# Patient Record
Sex: FEMALE | Race: WHITE
[De-identification: names, ages, dates, MRNs, and addresses within clinical notes are randomized per-mention and may not be internally consistent; named-entity substitution may affect disease eponyms.]

---

## 2018-01-18 ENCOUNTER — HOSPITAL ENCOUNTER (OUTPATIENT)
Dept: HOSPITAL 31 - C.ER | Age: 63
Setting detail: OBSERVATION
LOS: 2 days | Discharge: HOME | End: 2018-01-20
Payer: COMMERCIAL

## 2018-01-18 VITALS — BODY MASS INDEX: 44.1 KG/M2

## 2018-01-18 DIAGNOSIS — Z98.84: ICD-10-CM

## 2018-01-18 DIAGNOSIS — K29.00: Primary | ICD-10-CM

## 2018-01-18 DIAGNOSIS — Z87.11: ICD-10-CM

## 2018-01-18 DIAGNOSIS — Z88.6: ICD-10-CM

## 2018-01-18 DIAGNOSIS — E11.9: ICD-10-CM

## 2018-01-18 DIAGNOSIS — I10: ICD-10-CM

## 2018-01-18 DIAGNOSIS — M19.90: ICD-10-CM

## 2018-01-18 DIAGNOSIS — E78.00: ICD-10-CM

## 2018-01-18 DIAGNOSIS — D64.9: ICD-10-CM

## 2018-01-18 DIAGNOSIS — M81.0: ICD-10-CM

## 2018-01-18 DIAGNOSIS — Z79.899: ICD-10-CM

## 2018-01-18 DIAGNOSIS — K85.90: ICD-10-CM

## 2018-01-18 LAB
ALBUMIN SERPL-MCNC: 4 G/DL (ref 3.5–5)
ALBUMIN/GLOB SERPL: 1.3 {RATIO} (ref 1–2.1)
ALT SERPL-CCNC: 29 U/L (ref 9–52)
AST SERPL-CCNC: 24 U/L (ref 14–36)
BASE EXCESS BLDV CALC-SCNC: 1.2 MMOL/L (ref 0–2)
BASOPHILS # BLD AUTO: 0.1 K/UL (ref 0–0.2)
BASOPHILS NFR BLD: 1.1 % (ref 0–2)
BUN SERPL-MCNC: 27 MG/DL (ref 7–17)
CALCIUM SERPL-MCNC: 8.8 MG/DL (ref 8.6–10.4)
EOSINOPHIL # BLD AUTO: 0.6 K/UL (ref 0–0.7)
EOSINOPHIL NFR BLD: 7.7 % (ref 0–4)
ERYTHROCYTE [DISTWIDTH] IN BLOOD BY AUTOMATED COUNT: 15.7 % (ref 11.5–14.5)
GFR NON-AFRICAN AMERICAN: 50
HGB BLD-MCNC: 12.2 G/DL (ref 11–16)
LIPASE: 354 U/L (ref 23–300)
LYMPHOCYTES # BLD AUTO: 1.9 K/UL (ref 1–4.3)
LYMPHOCYTES NFR BLD AUTO: 22.7 % (ref 20–40)
MCH RBC QN AUTO: 30.3 PG (ref 27–31)
MCHC RBC AUTO-ENTMCNC: 33.6 G/DL (ref 33–37)
MCV RBC AUTO: 90.2 FL (ref 81–99)
MONOCYTES # BLD: 0.7 K/UL (ref 0–0.8)
MONOCYTES NFR BLD: 8.7 % (ref 0–10)
NEUTROPHILS # BLD: 5 K/UL (ref 1.8–7)
NEUTROPHILS NFR BLD AUTO: 59.8 % (ref 50–75)
NRBC BLD AUTO-RTO: 0.2 % (ref 0–2)
PCO2 BLDV: 41 MMHG (ref 40–60)
PH BLDV: 7.41 [PH] (ref 7.32–7.43)
PLATELET # BLD: 305 K/UL (ref 130–400)
PMV BLD AUTO: 7.7 FL (ref 7.2–11.7)
RBC # BLD AUTO: 4.02 MIL/UL (ref 3.8–5.2)
VENOUS BLOOD GAS PO2: 28 MM/HG (ref 30–55)
WBC # BLD AUTO: 8.3 K/UL (ref 4.8–10.8)

## 2018-01-18 PROCEDURE — 82803 BLOOD GASES ANY COMBINATION: CPT

## 2018-01-18 PROCEDURE — 43239 EGD BIOPSY SINGLE/MULTIPLE: CPT

## 2018-01-18 PROCEDURE — 96374 THER/PROPH/DIAG INJ IV PUSH: CPT

## 2018-01-18 PROCEDURE — 86301 IMMUNOASSAY TUMOR CA 19-9: CPT

## 2018-01-18 PROCEDURE — 86304 IMMUNOASSAY TUMOR CA 125: CPT

## 2018-01-18 PROCEDURE — 88342 IMHCHEM/IMCYTCHM 1ST ANTB: CPT

## 2018-01-18 PROCEDURE — 82948 REAGENT STRIP/BLOOD GLUCOSE: CPT

## 2018-01-18 PROCEDURE — 80061 LIPID PANEL: CPT

## 2018-01-18 PROCEDURE — 74177 CT ABD & PELVIS W/CONTRAST: CPT

## 2018-01-18 PROCEDURE — 96361 HYDRATE IV INFUSION ADD-ON: CPT

## 2018-01-18 PROCEDURE — 82378 CARCINOEMBRYONIC ANTIGEN: CPT

## 2018-01-18 PROCEDURE — 96375 TX/PRO/DX INJ NEW DRUG ADDON: CPT

## 2018-01-18 PROCEDURE — 88305 TISSUE EXAM BY PATHOLOGIST: CPT

## 2018-01-18 PROCEDURE — 85025 COMPLETE CBC W/AUTO DIFF WBC: CPT

## 2018-01-18 PROCEDURE — 83690 ASSAY OF LIPASE: CPT

## 2018-01-18 PROCEDURE — 99285 EMERGENCY DEPT VISIT HI MDM: CPT

## 2018-01-18 PROCEDURE — 80053 COMPREHEN METABOLIC PANEL: CPT

## 2018-01-18 PROCEDURE — 82150 ASSAY OF AMYLASE: CPT

## 2018-01-18 PROCEDURE — 36415 COLL VENOUS BLD VENIPUNCTURE: CPT

## 2018-01-18 PROCEDURE — 96376 TX/PRO/DX INJ SAME DRUG ADON: CPT

## 2018-01-18 NOTE — CT
PROCEDURE:  CT Abdomen and Pelvis with contrast



HISTORY:

abdominal pain



COMPARISON:

Abdomen and pelvis CT with contrast 08/04/2016.



TECHNIQUE:

Contrast dose: Visipaque 320, 100 cc



Radiation dose:



Total exam DLP = 1151.81 mGy-cm.



This CT exam was performed using one or more of the following dose 

reduction techniques: Automated exposure control, adjustment of the 

mA and/or kV according to patient size, and/or use of iterative 

reconstruction technique.



FINDINGS:



LOWER THORAX:

Unremarkable. 



LIVER:

Unremarkable. No gross lesion or ductal dilatation. 



GALLBLADDER AND BILE DUCTS:

Prior cholecystectomy a stable 13 mm dilatation of the proximal to 

mid common bile duct appreciated once again. No radiodense 

choledocholithiasis identified. 



PANCREAS:

Unremarkable. No gross lesion or ductal dilatation.



SPLEEN:

Unremarkable. 



ADRENALS:

Unremarkable. No mass. 



KIDNEYS AND URETERS:

Unremarkable. No hydronephrosis. No solid mass. 



VASCULATURE:

Unremarkable. No aortic aneurysm. 



BOWEL:

Post gastric bypass surgery changes are again seen at the left upper 

quadrant abdomen extending into a small-bowel loop in the left lower 

quadrant once again. No bowel obstruction is appreciated with limited 

fecal loading seen throughout various large-bowel segments. 



APPENDIX:

Normal appendix. 



PERITONEUM:

Unremarkable. No free fluid. No free air. 



LYMPH NODES:

Unremarkable. No enlarged lymph nodes. 



BLADDER:

Unremarkable. 



REPRODUCTIVE:

Unremarkable. 



BONES:

Stable grade 1 spondylolisthesis L5-S1. 



OTHER FINDINGS:

None.



IMPRESSION:

1. No definite acute abdominal or pelvic findings in the interval as 

discussed above. 



2. Prior cholecystectomy again evident.  Stable likely related 

dilatation of the common bile duct again noted as well. No radiodense 

choledocholithiasis. 



3. Prior gastric bypass surgery again evident. 



4. L5-S1 spondylolisthesis again evident, stable.

## 2018-01-18 NOTE — C.PDOC
History Of Present Illness


63 y/o female with history of gastritis and ulcers presents to ED with 

complaints of abdominal pain for 15 days  worsening with associated vomiting 

for 3 days. Patient states she had gastric bypass 8 years ago and reports she 

has taken Omeprazole with no improvement. Patient denies fever, chills, diarrhea

, blood in stool or any other complaints at this time. PMD: Dr. KATARZYNA Harrison 


Time Seen by Provider: 18 14:58


Chief Complaint (Nursing): Abdominal Pain


History Per: Patient


History/Exam Limitations: no limitations


Onset/Duration Of Symptoms: Days


Current Symptoms Are (Timing): Still Present


Location Of Pain/Discomfort: Epigastric





Past Medical History


Reviewed: Historical Data, Nursing Documentation, Vital Signs


Vital Signs: 


 Last Vital Signs











Temp  98.6 F   18 14:52


 


Pulse  82   18 17:46


 


Resp  18   18 17:46


 


BP  101/49 L  18 17:46


 


Pulse Ox  98   18 17:46














- Medical History


PMH: Anemia, Arthritis, Diabetes, Gastritis, HTN, Hypercholesterolemia, 

Osteoporosis


Surgical History: Appendectomy, Cholecystectomy





- [facility] Procedures








CLOSED ENDOSCOPIC BIOPSY OF LARGE INTESTINE (14)


ESOPHAGOGASTRODUODENOSCOPY [EGD] W/CLOSED BIOPSY (14)








Family History: States: No Known Family Hx





- Social History


Hx Tobacco Use: No


Hx Alcohol Use: No


Hx Substance Use: No





- Immunization History


Hx Tetanus Toxoid Vaccination: No


Hx Influenza Vaccination: No


Hx Pneumococcal Vaccination: No





Review Of Systems


Constitutional: Negative for: Fever, Chills


Gastrointestinal: Positive for: Vomiting, Abdominal Pain.  Negative for: 

Diarrhea


Musculoskeletal: Negative for: Back Pain


Skin: Negative for: Rash





Physical Exam





- Physical Exam


Appears: Non-toxic, No Acute Distress


Skin: Normal Color, Warm, Dry, No Rash


Head: Atraumatic, Normacephalic


Oral Mucosa: Moist


Neck: Normal ROM, Supple


Cardiovascular: Rhythm Regular


Respiratory: Normal Breath Sounds, No Rales, No Rhonchi, No Wheezing


Gastrointestinal/Abdominal: Soft, Tenderness (RUQ and Epigastric), No Guarding, 

No Rebound


Back: No CVA Tenderness


Extremity: Normal ROM, Capillary Refill (<2 seconds)


Neurological/Psych: Oriented x3





ED Course And Treatment





- Laboratory Results


Result Diagrams: 


 18 17:42





 18 16:51


O2 Sat by Pulse Oximetry: 98 (RA)


Pulse Ox Interpretation: Normal





Medical Decision Making


Medical Decision Makin disc w Dr Griffiths will admit for intractable abdominal pain





Disposition





- Disposition


Disposition: HOSPITALIZED


Disposition Time: 19:02


Condition: STABLE


Forms:  [facility] (English)





- Clinical Impression


Clinical Impression: 


 Abdominal pain








- Scribe Statement


The provider has reviewed the documentation as recorded by the Scribe


Santo Magallanes





All medical record entries made by the Augustibalbert were at my direction and 

personally dictated by me. I have reviewed the chart and agree that the record 

accurately reflects my personal performance of the history, physical exam, 

medical decision making, and the department course for this patient. I have 

also personally directed, reviewed, and agree with the discharge instructions 

and disposition.

## 2018-01-19 VITALS — RESPIRATION RATE: 20 BRPM

## 2018-01-19 RX ADMIN — PHENOBARBITAL, HYOSCYAMINE SULFATE, ATROPINE SULFATE, SCOPOLAMINE HYDROBROMIDE SCH TAB: 16.2; .1037; .0194; .0065 TABLET ORAL at 14:02

## 2018-01-19 RX ADMIN — PANTOPRAZOLE SODIUM SCH: 40 TABLET, DELAYED RELEASE ORAL at 11:00

## 2018-01-19 RX ADMIN — PHENOBARBITAL, HYOSCYAMINE SULFATE, ATROPINE SULFATE, SCOPOLAMINE HYDROBROMIDE SCH TAB: 16.2; .1037; .0194; .0065 TABLET ORAL at 18:39

## 2018-01-19 RX ADMIN — SUCRALFATE SCH GM: 1 SUSPENSION ORAL at 21:39

## 2018-01-19 RX ADMIN — DEXTROSE AND SODIUM CHLORIDE SCH MLS/HR: 5; 450 INJECTION, SOLUTION INTRAVENOUS at 20:30

## 2018-01-19 RX ADMIN — DEXTROSE AND SODIUM CHLORIDE SCH MLS/HR: 5; 450 INJECTION, SOLUTION INTRAVENOUS at 08:35

## 2018-01-19 RX ADMIN — PANTOPRAZOLE SODIUM SCH MG: 40 TABLET, DELAYED RELEASE ORAL at 12:36

## 2018-01-19 NOTE — CP.PCM.PN
Subjective





- Date & Time of Evaluation


Date of Evaluation: 01/19/18


Time of Evaluation: 09:00





Objective





- Vital Signs/Intake and Output


Vital Signs (last 24 hours): 


 











Temp Pulse Resp BP Pulse Ox


 


 98 F   63   20   104/70   95 


 


 01/19/18 16:00  01/19/18 16:00  01/19/18 16:00  01/19/18 16:00  01/19/18 16:00








Intake and Output: 


 











 01/19/18 01/20/18





 18:59 06:59


 


Intake Total 690 790


 


Balance 690 790














- Medications


Medications: 


 Current Medications





Belladonna/Phenobarbital (Donnatal)  1 tab PO TID [facility]


   Last Admin: 01/19/18 18:39 Dose:  1 tab


Dicyclomine HCl (Bentyl)  10 mg PO DAILY [facility]


   Last Admin: 01/19/18 12:37 Dose:  10 mg


Hydrochlorothiazide (Hydrodiuril)  25 mg PO DAILY [facility]


   Last Admin: 01/19/18 12:36 Dose:  25 mg


Hydromorphone HCl (Dilaudid)  0.25 mg IVP Q4H PRN


   PRN Reason: Pain, Mild (1-3)


   Last Admin: 01/18/18 23:05 Dose:  0.25 mg


Dextrose/Sodium Chloride (Dextrose 5%/0.45% Ns 1000 Ml)  1,000 mls @ 80 mls/hr 

IV .[id number] [facility]


   Last Admin: 01/19/18 20:30 Dose:  80 mls/hr


Lisinopril (Zestril)  10 mg PO DAILY [facility]


   Last Admin: 01/19/18 12:36 Dose:  10 mg


Metoclopramide HCl (Reglan)  5 mg PO TID PRN


   PRN Reason: Nausea/Vomiting


Ondansetron HCl (Zofran Inj)  8 mg IVP Q8H [facility]


   Stop: 01/22/18 08:31


   Last Admin: 01/19/18 16:55 Dose:  Not Given


Pantoprazole Sodium (Protonix Ec Tab)  40 mg PO DAILY [facility]


   Last Admin: 01/19/18 12:36 Dose:  40 mg


Rosuvastatin Calcium (Crestor)  5 mg PO HS [facility]


   Last Admin: 01/19/18 21:40 Dose:  5 mg


Sucralfate (Carafate Oral Susp)  1 gm PO ACBHS [facility]


   Last Admin: 01/19/18 21:39 Dose:  1 gm











- Labs


Labs: 


 





 01/18/18 17:42 





 01/18/18 16:51

## 2018-01-19 NOTE — CON
DATE:  01/18/2018



LOCATION:  350, bed [facility] was called for GI consultation by the admitting MD as well as the ER

physician and the ER staff.  The patient was seen and fully examined on

01/18/2018.  The entire chart is reviewed including but not limited to the

most recent lab and radiology study results, current and the previous

medication list, current and the previous medical events, allergy to

medication list as well as all the available current and the previous

medical record.  Case discussed with the staff at length.



HISTORY OF PRESENT ILLNESS:  This is a 62-year-old female who was admitted

to the hospital through the emergency room with a main complaint of severe

sharp, crampy abdominal pain mainly in the midepigastric area to the

midabdominal line associated with nausea and vomiting over the last 3 to 4

days without response to Prilosec intake.



No reported active bleeding.  No chest pain or palpitation.  No reported

significant complaint of shortness of breath, chills or fever.



PAST MEDICAL HISTORY:  Including many but not limited to,

1.  Peptic ulcer disease.

2.  Hypertension with hyperlipidemia.

3.  Osteoporosis.

4.  Status post cholecystectomy and appendectomy.

5.  Status post gastric bypass surgery about 8 years ago.



FAMILY HISTORY:  Unknown.



SOCIAL HISTORY:  Denies any recent history of cigarette smoking or alcohol

intake.



CURRENT MEDICATIONS:  Medications lists were reviewed.



ALLERGY TO MEDICATION:  ASPIRIN.



LABORATORY DATA:  After being admitted to the hospital, initial blood

workup showed normal CBC with increased BUN to 27, but normal creatinine

1.1 with lipase level of 354.



The patient initially had CAT scan of the abdomen and pelvis through the

emergency room, report is seen indicative of status post cholecystectomy

with mildly dilated common bile duct secondary to her surgery with prior

gastric bypass surgery.



PHYSICAL EXAMINATION:

GENERAL:  A 62-year-old female.  Appeared to be awake, alert, oriented. 

Angolan spoken mainly.  Seen with Angolan nursing .

VITAL SIGNS:  The patient is afebrile with a complaint of severe sharp

abdominal pain with pulse of 72, respiratory rate 20 to 22 with blood

pressure of 146/80.

HEENT:  Showed dry oral mucous membrane.  Nonicteric sclerae.

LUNGS:  Few scattered mild crepitation.  Breathing sounds are present

bilaterally.

HEART:  Positive S1 and S2.

LYMPH NODES:  No lymphadenitis or lymphadenopathy.

ABDOMEN:  Soft, mildly obese with mild distention and diffuse tenderness,

but mainly in the midepigastric area.  No mass or organomegaly.  No rebound

tenderness or guarding.

RECTAL:  The patient refused.

EXTREMITIES:  With evidence of osteoarthritis, mild.  No clubbing, cyanosis

or edema.

NEUROLOGIC:  No reported new neurological deficits, focal sensory or motor.

Peripheral pulses are present bilaterally.



IMPRESSION:

1.  Acute pancreatitis of unclear etiology.

2.  Reexacerbation of peptic ulcer disease with status post gastric bypass

surgery, to rule out marginal gastric ulcer.

3.  Multiple past medical history as reported above including but not

limited to hypertension, hyperlipidemia, osteoarthritis, as well as

osteoporosis and status post appendectomy and cholecystectomy.

4.  It has to be mentioned that her acute pancreatitis could be also

secondary to her hyperlipidemia.



SUGGESTIONS:

1.  Agree with your plan.

2.  Lipids profile.

3.  Keep n.p.o. until serum lipase, amylase level normal or near normal.

4.  Proton pump inhibitors IV.

5.  Antireflux measure.

6.  Reglan IV.

7.  peripheral hyperalimentation.

8.  Upper endoscopy when the patient is more stable clinically.

9.  Further recommendation to follow and the cancer markers to be ordered.



Thank you for letting me participate in your patient's case management.  We

will follow up closely with you.



__________________________________________

Klaudia Gupta MD





DD:  01/19/2018 12:21:21

DT:  01/19/2018 13:11:37

Job # [id number]

## 2018-01-20 VITALS
SYSTOLIC BLOOD PRESSURE: 108 MMHG | TEMPERATURE: 98.4 F | HEART RATE: 57 BPM | DIASTOLIC BLOOD PRESSURE: 72 MMHG | OXYGEN SATURATION: 98 %

## 2018-01-20 LAB
AMYLASE SERPL-CCNC: 69 U/L (ref 30–110)
HDLC SERPL-MCNC: 45 MG/DL (ref 30–70)
LDLC SERPL-MCNC: 134 MG/DL (ref 0–129)
LIPASE: 55 U/L (ref 23–300)

## 2018-01-20 RX ADMIN — PHENOBARBITAL, HYOSCYAMINE SULFATE, ATROPINE SULFATE, SCOPOLAMINE HYDROBROMIDE SCH TAB: 16.2; .1037; .0194; .0065 TABLET ORAL at 13:51

## 2018-01-20 RX ADMIN — PANTOPRAZOLE SODIUM SCH MG: 40 TABLET, DELAYED RELEASE ORAL at 10:33

## 2018-01-20 RX ADMIN — PHENOBARBITAL, HYOSCYAMINE SULFATE, ATROPINE SULFATE, SCOPOLAMINE HYDROBROMIDE SCH TAB: 16.2; .1037; .0194; .0065 TABLET ORAL at 10:33

## 2018-01-20 RX ADMIN — DEXTROSE AND SODIUM CHLORIDE SCH MLS/HR: 5; 450 INJECTION, SOLUTION INTRAVENOUS at 08:38

## 2018-01-20 RX ADMIN — SUCRALFATE SCH GM: 1 SUSPENSION ORAL at 08:30

## 2018-01-20 NOTE — CP.PCM.HP
<Padmini Smith - Last Filed: 01/20/18 20:06>





History of Present Illness





- History of Present Illness


History of Present Illness: 


Chief complaint: abdominal pain, epigastric 


62 yr  female w/ history of gastric bypass 8 yrs ago (no relief w/ 

omeprazole), anemia, arthritis, DM, gastritis, HTN, Hypercholesterolemia, 

osteoporosis, appendectomy, & cholecystectomy. Pt was seen at the bedside on 1/ 19/18. Reports ongoing abdominal pain for 15 days. She states that this happens 

from time to time and when she has an endoscopy she feels better. She rates her 

pain 5/10 in the epigastric area. She denies any fever, chills, n/v, diarrhea, 

constipation, urinary changes or distress. 








Present on Admission





- Present on Admission


Any Indicators Present on Admission: No


History of DVT/PE: No


History of Uncontrolled Diabetes: No


Urinary Catheter: No


Decubitus Ulcer Present: No





Review of Systems





- Constitutional


Constitutional: As Per HPI





- EENT


Eyes: As Per HPI


Ears: As Per HPI


Nose/Mouth/Throat: As Per HPI





- Breasts


Breasts: As Per HPI





- Cardiovascular


Cardiovascular: As Per HPI





- Respiratory


Respiratory: As Per HPI





- Gastrointestinal


Gastrointestinal: As Per HPI





- Genitourinary


Genitourinary: As Per HPI





- Reproductive: Female


Reproductive:Female: As Per HPI





- Musculoskeletal


Musculoskeletal: As Per HPI





- Integumentary


Integumentary: As Per HPI





- Neurological


Neurological: As Per HPI





- Psychiatric


Psychiatric: As Per HPI





- Endocrine


Endocrine: As Per HPI





- Hematologic/Lymphatic


Hematologic: As Per HPI





Past Patient History





- Past Medical History & Family History


Past Medical History?: Yes





- Past Social History


Smoking Status: Never Smoked





- CARDIAC


Hx Hypercholesterolemia: Yes


Hx Hypertension: Yes





- PULMONARY


Hx Respiratory Disorders: No





- NEUROLOGICAL


Hx Neurological Disorder: No





- HEENT


Hx HEENT Problems: No





- RENAL


Hx Chronic Kidney Disease: No





- ENDOCRINE/METABOLIC


Hx Endocrine Disorders: No





- HEMATOLOGICAL/ONCOLOGICAL


Hx Anemia: Yes





- INTEGUMENTARY


Hx Dermatological Problems: No





- MUSCULOSKELETAL/RHEUMATOLOGICAL


Hx Arthritis: Yes


Hx Osteoporosis: Yes





- GASTROINTESTINAL


Hx Gastritis: Yes





- GENITOURINARY/GYNECOLOGICAL


Hx Genitourinary Disorders: No





- PSYCHIATRIC


Hx Substance Use: No





- SURGICAL HISTORY


Hx Appendectomy: Yes


Hx Cholecystectomy: Yes





- ANESTHESIA


Hx Anesthesia: Yes


Hx Anesthesia Reactions: No


Hx Malignant Hyperthermia: No





Meds


Home Medications: 


 Home Medication List











 Medication  Instructions  Recorded  Confirmed  Type


 


Omeprazole 40 mg PO DAILY #30 capsule. 01/20/18  Rx


 


Sucralfate [Carafate] 1 gm PO TID #90 tablet 01/20/18  Rx











Allergies/Adverse Reactions: 


 Allergies











Allergy/AdvReac Type Severity Reaction Status Date / Time


 


aspirin Allergy  RASH Verified 01/18/18 14:51














Physical Exam





- Constitutional


Appears: Well





- Head Exam


Head Exam: ATRAUMATIC, NORMAL INSPECTION, NORMOCEPHALIC





- Eye Exam


Eye Exam: EOMI, Normal appearance, PERRL


Pupil Exam: NORMAL ACCOMODATION, PERRL





- ENT Exam


ENT Exam: Mucous Membranes Moist, Normal Exam





- Neck Exam


Neck exam: Positive for: Normal Inspection





- Respiratory Exam


Respiratory Exam: Clear to Auscultation Bilateral, NORMAL BREATHING PATTERN





- Cardiovascular Exam


Cardiovascular Exam: REGULAR RHYTHM





- GI/Abdominal Exam


GI & Abdominal Exam: Hypoactive Bowel Sounds, Soft





- Extremities Exam


Extremities exam: Positive for: normal inspection





- Back Exam


Back exam: NORMAL INSPECTION





- Neurological Exam


Neurological exam: Alert, CN II-XII Intact, Normal Gait, Oriented x3, Reflexes 

Normal





- Psychiatric Exam


Psychiatric exam: Normal Affect, Normal Mood





- Skin


Skin Exam: Dry, Intact, Normal Color, Warm





Results





- Vital Signs


Recent Vital Signs: 





 Last Vital Signs











Temp  98.4 F   01/20/18 08:27


 


Pulse  57 L  01/20/18 08:27


 


Resp  20   01/20/18 08:27


 


BP  108/72   01/20/18 08:27


 


Pulse Ox  98   01/20/18 12:51














- Labs


Result Diagrams: 


 01/18/18 17:42





 01/18/18 16:51


Labs: 





 Laboratory Results - last 24 hr











  01/19/18 01/20/18 01/20/18





  21:18 07:08 07:23


 


POC Glucose (mg/dL)  95  103 


 


Triglycerides    68


 


Cholesterol    198


 


LDL Cholesterol Direct    134 H


 


HDL Cholesterol    45


 


Amylase    69


 


Lipase    55














  01/20/18





  11:16


 


POC Glucose (mg/dL)  84


 


Triglycerides 


 


Cholesterol 


 


LDL Cholesterol Direct 


 


HDL Cholesterol 


 


Amylase 


 


Lipase 














Assessment & Plan


(1) Abdominal pain


Status: Acute   





(2) Gastritis


Status: Acute   





(3) Hyperglycemia


Status: Acute   





- Assessment and Plan (Free Text)


Plan: 





Pt going for upperendoscopy today. GI/VTE prophlyaxis ordered. Labs ordered. 





Consults:


GI - Dr. Muniz





Reviewed:


CT abd/pelvis w. contrast - WNL, prior cholecystecotmy, stable dilatation of 

common bile duct, prior gastric bypass sx, L5-S1 spondylolisthesis





- Date & Time


Date: 01/19/18


Time: 09:00





<Erin Griffiths - Last Filed: 01/21/18 10:44>





Results





- Vital Signs


Recent Vital Signs: 





 Last Vital Signs











Temp  98.4 F   01/20/18 08:27


 


Pulse  57 L  01/20/18 08:27


 


Resp  20   01/20/18 08:27


 


BP  108/72   01/20/18 08:27


 


Pulse Ox  98   01/20/18 12:51














- Labs


Result Diagrams: 


 01/18/18 17:42





 01/18/18 16:51


Labs: 





 Laboratory Results - last 24 hr











  01/20/18





  11:16


 


POC Glucose (mg/dL)  84














Assessment & Plan





- Assessment and Plan (Free Text)


Plan: 


62 yr  female w/ history of gastric bypass 8 yrs ago (no relief w/ 

omeprazole), anemia, arthritis, DM, gastritis, HTN, Hypercholesterolemia, 

osteoporosis, appendectomy, & cholecystectomy. Pt was seen at the bedside on 1/ 19/18. Reports ongoing abdominal pain for 15 days. She states that this happens 

from time to time and when she has an endoscopy she feels better. She rates her 

pain 5/10 in the epigastric area. She denies any fever, chills, n/v, diarrhea, 

constipation, urinary changes or distress. 


pt is seen and examined at bed side , looking comfortable , agreed all above , 

gi is on the case , chart . labs and meds noted . will f/u

## 2018-01-20 NOTE — CP.PCM.PN
Subjective





- Date & Time of Evaluation


Date of Evaluation: 01/20/18


Time of Evaluation: 11:00





- Subjective


Subjective: 





Alert, awake, no vomiting, no distress.





Objective





- Vital Signs/Intake and Output


Vital Signs (last 24 hours): 


 











Temp Pulse Resp BP Pulse Ox


 


 98.4 F   57 L  20   108/72   98 


 


 01/20/18 08:27  01/20/18 08:27  01/20/18 08:27  01/20/18 08:27  01/20/18 12:51








Intake and Output: 


 











 01/20/18 01/20/18





 06:59 18:59


 


Intake Total 1580 1000


 


Balance 1580 1000














- Medications


Medications: 


 Current Medications





Belladonna/Phenobarbital (Donnatal)  1 tab PO TID [facility]


   Last Admin: 01/20/18 13:51 Dose:  1 tab


Dicyclomine HCl (Bentyl)  10 mg PO DAILY [facility]


   Last Admin: 01/20/18 10:33 Dose:  10 mg


Hydrochlorothiazide (Hydrodiuril)  25 mg PO DAILY [facility]


   Last Admin: 01/20/18 10:33 Dose:  25 mg


Hydromorphone HCl (Dilaudid)  0.25 mg IVP Q4H PRN


   PRN Reason: Pain, Mild (1-3)


   Last Admin: 01/18/18 23:05 Dose:  0.25 mg


Dextrose/Sodium Chloride (Dextrose 5%/0.45% Ns 1000 Ml)  1,000 mls @ 80 mls/hr 

IV .[id number] [facility]


   Last Admin: 01/20/18 08:38 Dose:  80 mls/hr


Lisinopril (Zestril)  10 mg PO DAILY [facility]


   Last Admin: 01/20/18 10:33 Dose:  10 mg


Metoclopramide HCl (Reglan)  5 mg PO TID PRN


   PRN Reason: Nausea/Vomiting


Ondansetron HCl (Zofran Inj)  8 mg IVP Q8H [facility]


   Stop: 01/22/18 08:31


   Last Admin: 01/20/18 08:38 Dose:  8 mg


Pantoprazole Sodium (Protonix Ec Tab)  40 mg PO DAILY [facility]


   Last Admin: 01/20/18 10:33 Dose:  40 mg


Rosuvastatin Calcium (Crestor)  5 mg PO HS [facility]


   Last Admin: 01/19/18 21:40 Dose:  5 mg


Sucralfate (Carafate Oral Susp)  1 gm PO ACBHS [facility]


   Last Admin: 01/20/18 08:30 Dose:  1 gm











- Labs


Labs: 


 





 01/18/18 17:42 





 01/18/18 16:51 











Assessment and Plan





- Assessment and Plan (Free Text)


Assessment: 





Patient is seen and examined. Alert and orientedx3, denies acute pain, 

tolerating liquid diet. s/p endoscopy, gastritis, cleared by DR Lloyd for 

discharge home on omeprazole and carafate for 10 weeks. Advised to follow up 

with DR Lloyd and PMD as instructed

## 2018-06-13 ENCOUNTER — HOSPITAL ENCOUNTER (INPATIENT)
Dept: HOSPITAL 31 - C.ER | Age: 63
LOS: 1 days | Discharge: HOME | DRG: 189 | End: 2018-06-14
Attending: SURGERY | Admitting: SURGERY
Payer: COMMERCIAL

## 2018-06-13 VITALS — RESPIRATION RATE: 20 BRPM

## 2018-06-13 VITALS — BODY MASS INDEX: 38 KG/M2

## 2018-06-13 DIAGNOSIS — K35.80: Primary | ICD-10-CM

## 2018-06-13 DIAGNOSIS — E11.9: ICD-10-CM

## 2018-06-13 DIAGNOSIS — Z98.84: ICD-10-CM

## 2018-06-13 DIAGNOSIS — I10: ICD-10-CM

## 2018-06-13 DIAGNOSIS — E78.5: ICD-10-CM

## 2018-06-13 DIAGNOSIS — K43.9: ICD-10-CM

## 2018-06-13 DIAGNOSIS — M81.0: ICD-10-CM

## 2018-06-13 LAB
ALBUMIN SERPL-MCNC: 4.3 G/DL (ref 3.5–5)
ALBUMIN/GLOB SERPL: 1.6 {RATIO} (ref 1–2.1)
ALT SERPL-CCNC: 24 U/L (ref 9–52)
ANISOCYTOSIS BLD QL SMEAR: SLIGHT
AST SERPL-CCNC: 32 U/L (ref 14–36)
BACTERIA #/AREA URNS HPF: (no result) /[HPF]
BASOPHILS # BLD AUTO: 0.1 K/UL (ref 0–0.2)
BASOPHILS NFR BLD: 0.5 % (ref 0–2)
BILIRUB UR-MCNC: NEGATIVE MG/DL
BUN SERPL-MCNC: 23 MG/DL (ref 7–17)
CALCIUM SERPL-MCNC: 9.3 MG/DL (ref 8.6–10.4)
EOSINOPHIL # BLD AUTO: 0.2 K/UL (ref 0–0.7)
EOSINOPHIL NFR BLD: 2 % (ref 0–4)
ERYTHROCYTE [DISTWIDTH] IN BLOOD BY AUTOMATED COUNT: 14.2 % (ref 11.5–14.5)
GFR NON-AFRICAN AMERICAN: > 60
GLUCOSE UR STRIP-MCNC: NORMAL MG/DL
HGB BLD-MCNC: 11.7 G/DL (ref 11–16)
HYPOCHROMIC: SLIGHT
LEUKOCYTE ESTERASE UR-ACNC: (no result) LEU/UL
LIPASE: 121 U/L (ref 23–300)
LYMPHOCYTE: 8 % (ref 20–40)
LYMPHOCYTES # BLD AUTO: 1.1 K/UL (ref 1–4.3)
LYMPHOCYTES NFR BLD AUTO: 9.8 % (ref 20–40)
MCH RBC QN AUTO: 29.8 PG (ref 27–31)
MCHC RBC AUTO-ENTMCNC: 34 G/DL (ref 33–37)
MCV RBC AUTO: 87.8 FL (ref 81–99)
MONOCYTE: 8 % (ref 0–10)
MONOCYTES # BLD: 0.6 K/UL (ref 0–0.8)
MONOCYTES NFR BLD: 5.4 % (ref 0–10)
NEUTROPHILS # BLD: 9.2 K/UL (ref 1.8–7)
NEUTROPHILS NFR BLD AUTO: 82.3 % (ref 50–75)
NEUTROPHILS NFR BLD AUTO: 84 % (ref 50–75)
NRBC BLD AUTO-RTO: 0.1 % (ref 0–2)
PH UR STRIP: 6 [PH] (ref 5–8)
PLATELET # BLD EST: NORMAL 10*3/UL
PLATELET # BLD: 299 K/UL (ref 130–400)
PMV BLD AUTO: 7.6 FL (ref 7.2–11.7)
POIKILOCYTOSIS BLD QL SMEAR: SLIGHT
PROT UR STRIP-MCNC: NEGATIVE MG/DL
RBC # BLD AUTO: 3.92 MIL/UL (ref 3.8–5.2)
RBC # UR STRIP: (no result) /UL
SP GR UR STRIP: 1.02 (ref 1–1.03)
SQUAMOUS EPITHIAL: 11 /HPF (ref 0–5)
TOTAL CELLS COUNTED BLD: 100
UROBILINOGEN UR-MCNC: NORMAL MG/DL (ref 0.2–1)
WBC # BLD AUTO: 11.2 K/UL (ref 4.8–10.8)

## 2018-06-13 RX ADMIN — CIPROFLOXACIN SCH MLS/HR: 2 INJECTION, SOLUTION INTRAVENOUS at 21:50

## 2018-06-13 RX ADMIN — WATER SCH MLS/HR: 1 INJECTION INTRAMUSCULAR; INTRAVENOUS; SUBCUTANEOUS at 17:57

## 2018-06-13 NOTE — CT
PROCEDURE:  CT Abdomen and Pelvis with contrast



HISTORY:

abd pain



COMPARISON:

CT scan of the abdomen and pelvis dated 01/18/2018.



TECHNIQUE:

Contrast dose: 100 mL Visipaque 320



Radiation dose:



Total exam DLP = 1164.4 mGy-cm.



This CT exam was performed using one or more of the following dose 

reduction techniques: Automated exposure control, adjustment of the 

mA and/or kV according to patient size, and/or use of iterative 

reconstruction technique.



FINDINGS:



LOWER THORAX:

Right upper lobe calcified granuloma redemonstrated.  No focal 

consolidation or pleural effusion. Heart size normal. 



LIVER:

Unremarkable. No gross lesion or ductal dilatation. 



GALLBLADDER AND BILE DUCTS:

Prior cholecystectomy with surgical clips in place. 



PANCREAS:

Unremarkable. No gross lesion or ductal dilatation.



SPLEEN:

Unremarkable. 



ADRENALS:

Unremarkable. No mass. 



KIDNEYS AND URETERS:

Unremarkable. No hydronephrosis. No solid mass. 



VASCULATURE:

Unremarkable. No aortic aneurysm. 



BOWEL:

Prior gastric bypass surgery. No obstruction. No gross mural 

thickening. 



APPENDIX:

Dilated thick walled appendix measuring up to 1.2 cm with 

periappendiceal stranding. 



PERITONEUM:

At the level of the gallbladder surgical clips, there is a fat and 

nonobstructed bowel containing ventral abdominal wall hernia to the 

right of midline measuring 8.9 x 3.4 cm with a 7.8 cm neck. 

Supraumbilical fat containing ventral hernia to the right of the 

midline measuring 45.4 x 3.3 cm with a 3.6 cm neck. Small fat 

containing umbilical hernia. No free fluid. No free air. 



LYMPH NODES:

Unremarkable. No enlarged lymph nodes. 



BLADDER:

Unremarkable. 



REPRODUCTIVE:

Unremarkable. 



BONES:

No acute fracture. Chronic bilateral L5 pars articularis defects with 

grade 1-2 anterolisthesis of L5 on S1. 



OTHER FINDINGS:

None.



IMPRESSION:

Acute appendicitis. No evidence of perforation or adjacent fluid 

collection. 



Multiple ventral abdominal wall hernias as described in detail above.



Findings conveyed to HERNANDEZ Merida by Dr. Finch at 2:43 p.m. on 06/13/2018.

## 2018-06-13 NOTE — CP.PCM.HP
<Angel Kern - Last Filed: 06/13/18 18:04>





History of Present Illness





- History of Present Illness


History of Present Illness: 


General Surgery Note for Dr. Armas





This is a 63F with a PMH of morbid obeisity, HTN, HLD who presents with 5 days 

of abdominal pain in the mid abdomen that localized to the RLQ. This is the 

first time this has happened. She denies any fevers or chills at home. She is 

moving her bowels normally. In the CT she had a CT that was significant for an 

acute appendicitis, and multiple ventral hernias.








PMH: HTN


PSH: gastric bypass 8 years ago


FH: unremarkable


SH: Denies Vices


Meds: Lisinopril 20/HCTZ 25, Pravistatin 40


All: ASA





Present on Admission





- Present on Admission


Any Indicators Present on Admission: No





Review of Systems





- Review of Systems


All systems: reviewed and no additional remarkable complaints except





- Constitutional


Constitutional: Anorexia.  absent: Chills, Fever





- EENT


Eyes: absent: Blurred Vision, Change in Vision





- Cardiovascular


Cardiovascular: absent: Chest Pain, Dyspnea





- Respiratory


Respiratory: absent: Cough, Dyspnea





- Gastrointestinal


Gastrointestinal: Abdominal Pain, Nausea.  absent: Melena, Vomiting





- Genitourinary


Genitourinary: absent: Dysuria





- Musculoskeletal


Musculoskeletal: absent: Arthralgias, Back Pain





- Integumentary


Integumentary: absent: Bleeding Lesions, Wounds





Past Patient History





- Past Medical History & Family History


Past Medical History?: Yes





- Past Social History


Smoking Status: Never Smoked





- CARDIAC


Hx Hypercholesterolemia: Yes


Hx Hypertension: Yes





- PULMONARY


Hx Respiratory Disorders: No





- NEUROLOGICAL


Hx Neurological Disorder: No





- HEENT


Hx HEENT Problems: No





- RENAL


Hx Chronic Kidney Disease: No





- ENDOCRINE/METABOLIC


Hx Endocrine Disorders: No





- HEMATOLOGICAL/ONCOLOGICAL


Hx Anemia: Yes





- INTEGUMENTARY


Hx Dermatological Problems: No





- MUSCULOSKELETAL/RHEUMATOLOGICAL


Hx Arthritis: Yes


Hx Osteoporosis: Yes





- GASTROINTESTINAL


Hx Gastritis: Yes





- GENITOURINARY/GYNECOLOGICAL


Hx Genitourinary Disorders: No





- PSYCHIATRIC


Hx Substance Use: No





- SURGICAL HISTORY


Hx Appendectomy: Yes


Hx Cholecystectomy: Yes





- ANESTHESIA


Hx Anesthesia: Yes


Hx Anesthesia Reactions: No


Hx Malignant Hyperthermia: No





Meds


Allergies/Adverse Reactions: 


 Allergies











Allergy/AdvReac Type Severity Reaction Status Date / Time


 


aspirin Allergy  RASH Verified 06/13/18 10:00














Physical Exam





- Constitutional


Appears: Non-toxic, No Acute Distress





- Head Exam


Head Exam: ATRAUMATIC, NORMOCEPHALIC





- Eye Exam


Eye Exam: EOMI, Normal appearance





- ENT Exam


ENT Exam: Mucous Membranes Moist





- Respiratory Exam


Respiratory Exam: NORMAL BREATHING PATTERN





- Cardiovascular Exam


Cardiovascular Exam: +S1, +S2





- GI/Abdominal Exam


GI & Abdominal Exam: Hernia, Soft, Tenderness.  absent: Distended, Firm, 

Guarding, Rebound, Rigid





- Neurological Exam


Neurological exam: Alert, Normal Gait





- Psychiatric Exam


Psychiatric exam: Normal Affect, Normal Mood





- Skin


Skin Exam: Dry, Warm





Results





- Vital Signs


Recent Vital Signs: 





 Last Vital Signs











Temp  98.6 F   06/13/18 15:25


 


Pulse  64   06/13/18 15:25


 


Resp  16   06/13/18 15:25


 


BP  110/77   06/13/18 15:25


 


Pulse Ox  98   06/13/18 15:25














- Labs


Result Diagrams: 


 06/13/18 11:22





 06/13/18 11:22


Labs: 





 Laboratory Results - last 24 hr











  06/13/18 06/13/18 06/13/18





  09:56 11:22 11:22


 


WBC   11.2 H 


 


RBC   3.92 


 


Hgb   11.7 


 


Hct   34.4 


 


MCV   87.8  D 


 


MCH   29.8 


 


MCHC   34.0 


 


RDW   14.2 


 


Plt Count   299 


 


MPV   7.6 


 


Neut % (Auto)   82.3 H 


 


Lymph % (Auto)   9.8 L 


 


Mono % (Auto)   5.4 


 


Eos % (Auto)   2.0 


 


Baso % (Auto)   0.5 


 


Neut # (Auto)   9.2 H 


 


Lymph # (Auto)   1.1 


 


Mono # (Auto)   0.6 


 


Eos # (Auto)   0.2 


 


Baso # (Auto)   0.1 


 


Neutrophils % (Manual)   84 H 


 


Lymphocytes % (Manual)   8 L 


 


Monocytes % (Manual)   8 


 


Platelet Estimate   Normal 


 


Hypochromasia (manual)   Slight 


 


Poikilocytosis (manual   Slight 


 


Anisocytosis (manual)   Slight 


 


Sodium    140


 


Potassium    4.2


 


Chloride    103


 


Carbon Dioxide    26


 


Anion Gap    15


 


BUN    23 H


 


Creatinine    0.8


 


Est GFR ( Amer)    > 60


 


Est GFR (Non-Af Amer)    > 60


 


Random Glucose    99


 


Calcium    9.3


 


Total Bilirubin    1.0


 


AST    32


 


ALT    24


 


Alkaline Phosphatase    90


 


Total Protein    7.1


 


Albumin    4.3


 


Globulin    2.7


 


Albumin/Globulin Ratio    1.6


 


Lipase    121


 


Urine Color  Yellow  


 


Urine Clarity  Hazy  


 


Urine pH  6.0  


 


Ur Specific Gravity  1.018  


 


Urine Protein  Negative  


 


Urine Glucose (UA)  Normal  


 


Urine Ketones  Negative  


 


Urine Blood  1+ H  


 


Urine Nitrate  Positive H  


 


Urine Bilirubin  Negative  


 


Urine Urobilinogen  Normal  


 


Ur Leukocyte Esterase  3+ H  


 


Urine WBC (Auto)  87 H  


 


Urine RBC (Auto)  3  


 


Ur Squamous Epith Cells  11 H  


 


Urine Bacteria  Many H  














- Imaging and Cardiology


  ** CT scan - abdomen


Status: Image reviewed by me, Report reviewed by me





Assessment & Plan





- Assessment and Plan (Free Text)


Assessment: 


63F with an acute appendicitis





NPO


IVF


ABX


Further recs per Dr. Cleve Kern PGY2


[phone]








<Frederick Armas - Last Filed: 06/16/18 00:33>





Results





- Vital Signs


Recent Vital Signs: 





 Last Vital Signs











Temp  98.1 F   06/14/18 16:00


 


Pulse  60   06/14/18 16:00


 


Resp  20   06/14/18 16:00


 


BP  116/66   06/14/18 16:00


 


Pulse Ox  96   06/14/18 16:00














- Labs


Result Diagrams: 


 06/14/18 08:29





 06/14/18 08:29





Attending/Attestation





- Attestation


I have personally seen and examined this patient.: Yes


I have fully participated in the care of the patient.: Yes


I have reviewed all pertinent clinical information: Yes


Notes (Text): 





Pt was seen and examined at bedside 


Agree with above note and assessment except


Pt with upper abdominal pain around ventral hernia site since last 1 month.


No RLQ pain and tenderness


CT scan reviewed with Radiologist


Ass: No clinical evidence of Acute appendicitis 


Most likely all symptoms are related to Gastroenteritis


Clear liquid diet


Repeat CBC in am


Serial abdominal exam


IV antibiotics


Plan d.w pt in detail


Risk and benefit explained in detail.

## 2018-06-13 NOTE — CP.PCM.CON
History of Present Illness





- History of Present Illness


History of Present Illness: 





Patient is a 63F with a PMH of HTN who comes to the ED with a CC of abdominal 

pain. States the pain started today. Diffuse in location. Nothing makes the 

pain better or worse. Sharp in nature. Does not radiate. The pain is constant. 

No nausea or vomiting. No fevers or chills. No diarrhea. Patient has no other 

medical problems. No other complaints at this time. History of gastric bypass 8 

years ago.





PMH: HTN


PSH: gastric bypass 8 years ago


FH: unremarkable


SH: does not work, does not smoke, drink or do drugs


Meds: Lisinopril 20/HCTZ 25, Pravistatin 40


All: ASA





Review of Systems





- Review of Systems


Review of Systems: 





per HPI





Past Patient History





- Past Medical History & Family History


Past Medical History?: Yes





- Past Social History


Smoking Status: Never Smoked





- CARDIAC


Hx Hypercholesterolemia: Yes


Hx Hypertension: Yes





- PULMONARY


Hx Respiratory Disorders: No





- NEUROLOGICAL


Hx Neurological Disorder: No





- HEENT


Hx HEENT Problems: No





- RENAL


Hx Chronic Kidney Disease: No





- ENDOCRINE/METABOLIC


Hx Endocrine Disorders: No





- HEMATOLOGICAL/ONCOLOGICAL


Hx Anemia: Yes





- INTEGUMENTARY


Hx Dermatological Problems: No





- MUSCULOSKELETAL/RHEUMATOLOGICAL


Hx Arthritis: Yes


Hx Osteoporosis: Yes





- GASTROINTESTINAL


Hx Gastritis: Yes





- GENITOURINARY/GYNECOLOGICAL


Hx Genitourinary Disorders: No





- PSYCHIATRIC


Hx Substance Use: No





- SURGICAL HISTORY


Hx Appendectomy: Yes


Hx Cholecystectomy: Yes





- ANESTHESIA


Hx Anesthesia: Yes


Hx Anesthesia Reactions: No


Hx Malignant Hyperthermia: No





Meds


Allergies/Adverse Reactions: 


 Allergies











Allergy/AdvReac Type Severity Reaction Status Date / Time


 


aspirin Allergy  RASH Verified 06/13/18 10:00














- Medications


Medications: 


 Current Medications





Hydrochlorothiazide (Hydrodiuril)  25 mg PO DAILY CHRISS


Sodium Chloride (Sodium Chloride 0.9%)  1,000 mls @ 150 mls/hr IV .Q6H40M CHRISS


Ciprofloxacin (Cipro 400mg/200ml Dsw)  400 mg in 200 mls @ 133 mls/hr IVPB Q12H 

CHRISS


   PRN Reason: Protocol


Metronidazole (Flagyl)  500 mg in 100 mls @ 100 mls/hr IVPB Q8H CHRISS


   PRN Reason: Protocol


Lisinopril (Zestril)  20 mg PO DAILY CHRISS


Rosuvastatin Calcium (Crestor)  20 mg PO HS CHRISS











Physical Exam





- Constitutional


Appears: Well





- Head Exam


Head Exam: ATRAUMATIC, NORMAL INSPECTION, NORMOCEPHALIC





- Eye Exam


Eye Exam: EOMI, Normal appearance, PERRL


Pupil Exam: NORMAL ACCOMODATION, PERRL





- ENT Exam


ENT Exam: Mucous Membranes Moist, Normal Exam





- Neck Exam


Neck exam: Positive for: Normal Inspection





- Respiratory Exam


Respiratory Exam: Clear to Auscultation Bilateral, NORMAL BREATHING PATTERN





- Cardiovascular Exam


Cardiovascular Exam: REGULAR RHYTHM





- GI/Abdominal Exam


GI & Abdominal Exam: Normal Bowel Sounds, Soft, Tenderness (diffuse tenderness 

to palpation but more in the RLQ).  absent: Distended





- Extremities Exam


Extremities exam: Positive for: normal inspection





- Back Exam


Back exam: NORMAL INSPECTION





- Neurological Exam


Neurological exam: Alert, CN II-XII Intact, Normal Gait, Oriented x3, Reflexes 

Normal





- Psychiatric Exam


Psychiatric exam: Normal Affect, Normal Mood





- Skin


Skin Exam: Dry, Intact, Normal Color, Warm





Results





- Vital Signs


Recent Vital Signs: 


 Last Vital Signs











Temp  98.6 F   06/13/18 15:25


 


Pulse  64   06/13/18 15:25


 


Resp  16   06/13/18 15:25


 


BP  110/77   06/13/18 15:25


 


Pulse Ox  98   06/13/18 15:25














- Labs


Result Diagrams: 


 06/13/18 11:22





 06/13/18 11:22


Labs: 


 Laboratory Results - last 24 hr











  06/13/18 06/13/18 06/13/18





  09:56 11:22 11:22


 


WBC   11.2 H 


 


RBC   3.92 


 


Hgb   11.7 


 


Hct   34.4 


 


MCV   87.8  D 


 


MCH   29.8 


 


MCHC   34.0 


 


RDW   14.2 


 


Plt Count   299 


 


MPV   7.6 


 


Neut % (Auto)   82.3 H 


 


Lymph % (Auto)   9.8 L 


 


Mono % (Auto)   5.4 


 


Eos % (Auto)   2.0 


 


Baso % (Auto)   0.5 


 


Neut # (Auto)   9.2 H 


 


Lymph # (Auto)   1.1 


 


Mono # (Auto)   0.6 


 


Eos # (Auto)   0.2 


 


Baso # (Auto)   0.1 


 


Neutrophils % (Manual)   84 H 


 


Lymphocytes % (Manual)   8 L 


 


Monocytes % (Manual)   8 


 


Platelet Estimate   Normal 


 


Hypochromasia (manual)   Slight 


 


Poikilocytosis (manual   Slight 


 


Anisocytosis (manual)   Slight 


 


Sodium    140


 


Potassium    4.2


 


Chloride    103


 


Carbon Dioxide    26


 


Anion Gap    15


 


BUN    23 H


 


Creatinine    0.8


 


Est GFR ( Amer)    > 60


 


Est GFR (Non-Af Amer)    > 60


 


Random Glucose    99


 


Calcium    9.3


 


Total Bilirubin    1.0


 


AST    32


 


ALT    24


 


Alkaline Phosphatase    90


 


Total Protein    7.1


 


Albumin    4.3


 


Globulin    2.7


 


Albumin/Globulin Ratio    1.6


 


Lipase    121


 


Urine Color  Yellow  


 


Urine Clarity  Hazy  


 


Urine pH  6.0  


 


Ur Specific Gravity  1.018  


 


Urine Protein  Negative  


 


Urine Glucose (UA)  Normal  


 


Urine Ketones  Negative  


 


Urine Blood  1+ H  


 


Urine Nitrate  Positive H  


 


Urine Bilirubin  Negative  


 


Urine Urobilinogen  Normal  


 


Ur Leukocyte Esterase  3+ H  


 


Urine WBC (Auto)  87 H  


 


Urine RBC (Auto)  3  


 


Ur Squamous Epith Cells  11 H  


 


Urine Bacteria  Many H  














Assessment & Plan


(1) Acute appendicitis without peritonitis


Assessment and Plan: 


Surgeon is Dr. Armas. We will start Cipro/flagyl at this time as evidence 

points to decrease length of stay and decrease morbidity in patient with acute 

intrabdominal infections when compared to Zosyn. Continue to hydrate the 

patient. NS @ 125 is appropriate at this time. In terms of medical optimization 

for surgery patient is under 65, has no history of bleeding, CAD, CHF, COPD, CKD

, DM. Patient did have a remote history of pre DM and was treated with diet 

control, Metabolic surgery in the form of gastric bypass and metformin. From a 

medical standpoint patient is medically optimized for an abdominal surgery if 

this kind and is a low to moderate risk of perioperative complications. Please 

let us know if you have any other questions. 


Status: Acute   Priority: High   





(2) Hypertension


Assessment and Plan: 


patients pressure is controlled. Continue home medications at this time. 


Status: Acute

## 2018-06-13 NOTE — C.PDOC
History Of Present Illness


62 y/o female with Hx of gastric bypass 6 years ago, presents to ED with 

complaints of epigastric abdominal pain associated with vomiting that began few 

days ago. Patient describes pain has worsened. Denies fever. 








PCP: Klaudia Lloyd 


Time Seen by Provider: 06/13/18 10:07


Chief Complaint (Nursing): Abdominal Pain


History Per: Patient


History/Exam Limitations: no limitations


Onset/Duration Of Symptoms: Hrs


Current Symptoms Are (Timing): Still Present


Location Of Pain/Discomfort: Epigastric


Radiation Of Pain To:: None


Quality Of Discomfort: "Pain"


Associated Symptoms: Vomiting.  denies: Fever, Chills, Diarrhea


Exacerbating Factors: None


Alleviating Factors: None


Last Bowel Movement: Today


Recent travel outside of the [place]: No


Abnormal Vaginal Bleeding: No





Past Medical History


Reviewed: Historical Data, Nursing Documentation, Vital Signs


Vital Signs: 


 Last Vital Signs











Temp  99.2 F   06/13/18 10:01


 


Pulse  77   06/13/18 10:01


 


Resp  20   06/13/18 10:01


 


BP  136/84   06/13/18 10:01


 


Pulse Ox  99   06/13/18 11:56














- Medical History


PMH: Anemia, Arthritis, Diabetes, Gastritis, HTN, Hypercholesterolemia, 

Osteoporosis


Surgical History: Appendectomy, Cholecystectomy





- [facility] Procedures








CLOSED ENDOSCOPIC BIOPSY OF LARGE INTESTINE (03/03/14)


ESOPHAGOGASTRODUODENOSCOPY [EGD] W/CLOSED BIOPSY (03/03/14)











- Social History


Hx Tobacco Use: No


Hx Alcohol Use: No


Hx Substance Use: No





- Immunization History


Hx Tetanus Toxoid Vaccination: No


Hx Influenza Vaccination: No


Hx Pneumococcal Vaccination: No





Review Of Systems


Constitutional: Negative for: Fever, Chills


Gastrointestinal: Positive for: Vomiting, Abdominal Pain.  Negative for: 

Diarrhea, Constipation


Genitourinary: Negative for: Dysuria


Skin: Negative for: Rash


Neurological: Negative for: Weakness, Numbness





Physical Exam





- Physical Exam


Appears: Non-toxic, No Acute Distress


Skin: Warm, Dry


Head: Atraumatic, Normacephalic


Eye(s): bilateral: Normal Inspection, PERRL, EOMI


Oral Mucosa: Moist


Neck: Supple


Chest: Symmetrical, No Tenderness


Cardiovascular: Rhythm Regular, No Murmur


Respiratory: Normal Breath Sounds, No Decreased Breath Sounds, No Rales, No 

Rhonchi, No Wheezing


Gastrointestinal/Abdominal: Bowel Sounds (Active), Soft, Tenderness (Diffused), 

Other (Healed surgical wound mid abdomen; Obese abdomen; )


Extremity: Normal ROM, No Deformity


Extremity: Bilateral: Atraumatic, Normal Color And Temperature, Normal ROM


Neurological/Psych: Oriented x3 (Awake and Alert), Normal Speech, Other (No 

focal deficits )


Gait: Steady





ED Course And Treatment





- Laboratory Results


Result Diagrams: 


 06/13/18 11:22





 06/13/18 11:22


O2 Sat by Pulse Oximetry: 99 (RA)


Pulse Ox Interpretation: Normal





Medical Decision Making


Medical Decision Making: 





Administered Pepcid, lidocaine, Zofran and IV fluids.


Ordered blood work and urinalysis. 





Disposition





- Disposition


Forms:  [facility] (English)





- PA / NP / Resident Statement


MD/DO has reviewed & agrees with the documentation as recorded.





- Scribe Statement


The provider has reviewed the documentation as recorded by the Augustibalbert Cartagena





All medical record entries made by the Augustibalbert were at my direction and 

personally dictated by me. I have reviewed the chart and agree that the record 

accurately reflects my personal performance of the history, physical exam, 

medical decision making, and the department course for this patient. I have 

also personally directed, reviewed, and agree with the discharge instructions 

and disposition.

## 2018-06-14 VITALS — TEMPERATURE: 98.1 F | HEART RATE: 60 BPM

## 2018-06-14 VITALS — DIASTOLIC BLOOD PRESSURE: 66 MMHG | SYSTOLIC BLOOD PRESSURE: 116 MMHG | OXYGEN SATURATION: 96 %

## 2018-06-14 LAB
ALBUMIN SERPL-MCNC: 3.5 G/DL (ref 3.5–5)
ALBUMIN/GLOB SERPL: 1.3 {RATIO} (ref 1–2.1)
ALT SERPL-CCNC: 21 U/L (ref 9–52)
AST SERPL-CCNC: 22 U/L (ref 14–36)
BASOPHILS # BLD AUTO: 0 K/UL (ref 0–0.2)
BASOPHILS NFR BLD: 0.5 % (ref 0–2)
BUN SERPL-MCNC: 11 MG/DL (ref 7–17)
CALCIUM SERPL-MCNC: 8.7 MG/DL (ref 8.6–10.4)
EOSINOPHIL # BLD AUTO: 0.2 K/UL (ref 0–0.7)
EOSINOPHIL NFR BLD: 3.2 % (ref 0–4)
ERYTHROCYTE [DISTWIDTH] IN BLOOD BY AUTOMATED COUNT: 14 % (ref 11.5–14.5)
GFR NON-AFRICAN AMERICAN: > 60
HGB BLD-MCNC: 10.8 G/DL (ref 11–16)
LYMPHOCYTES # BLD AUTO: 1.1 K/UL (ref 1–4.3)
LYMPHOCYTES NFR BLD AUTO: 19.5 % (ref 20–40)
MCH RBC QN AUTO: 29.5 PG (ref 27–31)
MCHC RBC AUTO-ENTMCNC: 33.4 G/DL (ref 33–37)
MCV RBC AUTO: 88.2 FL (ref 81–99)
MONOCYTES # BLD: 0.4 K/UL (ref 0–0.8)
MONOCYTES NFR BLD: 7.1 % (ref 0–10)
NEUTROPHILS # BLD: 4 K/UL (ref 1.8–7)
NEUTROPHILS NFR BLD AUTO: 69.7 % (ref 50–75)
NRBC BLD AUTO-RTO: 0.1 % (ref 0–2)
PLATELET # BLD: 276 K/UL (ref 130–400)
PMV BLD AUTO: 7.7 FL (ref 7.2–11.7)
RBC # BLD AUTO: 3.67 MIL/UL (ref 3.8–5.2)
WBC # BLD AUTO: 5.7 K/UL (ref 4.8–10.8)

## 2018-06-14 RX ADMIN — WATER SCH MLS/HR: 1 INJECTION INTRAMUSCULAR; INTRAVENOUS; SUBCUTANEOUS at 01:23

## 2018-06-14 RX ADMIN — WATER SCH MLS/HR: 1 INJECTION INTRAMUSCULAR; INTRAVENOUS; SUBCUTANEOUS at 10:00

## 2018-06-14 RX ADMIN — WATER SCH MLS/HR: 1 INJECTION INTRAMUSCULAR; INTRAVENOUS; SUBCUTANEOUS at 17:35

## 2018-06-14 RX ADMIN — CIPROFLOXACIN SCH MLS/HR: 2 INJECTION, SOLUTION INTRAVENOUS at 17:37

## 2018-06-14 RX ADMIN — CIPROFLOXACIN SCH MLS/HR: 2 INJECTION, SOLUTION INTRAVENOUS at 05:40

## 2018-06-14 NOTE — CP.PCM.DIS
Provider





- Provider


Date of Admission: 


06/13/18 15:53





Attending physician: 


Frederick Armas MD





Time Spent in preparation of Discharge (in minutes): 40





Hospital Course





- Lab Results


Lab Results: 


 Micro Results





06/13/18 12:22   Urine   Urine Culture - Preliminary


                            Gram Negative Dominick





 Most Recent Lab Values











WBC  5.7 K/uL (4.8-10.8)   06/14/18  08:29    


 


RBC  3.67 Mil/uL (3.80-5.20)  L  06/14/18  08:29    


 


Hgb  10.8 g/dL (11.0-16.0)  L  06/14/18  08:29    


 


Hct  32.4 % (34.0-47.0)  L  06/14/18  08:29    


 


MCV  88.2 fL (81.0-99.0)   06/14/18  08:29    


 


MCH  29.5 pg (27.0-31.0)   06/14/18  08:29    


 


MCHC  33.4 g/dL (33.0-37.0)   06/14/18  08:29    


 


RDW  14.0 % (11.5-14.5)   06/14/18  08:29    


 


Plt Count  276 K/uL (130-400)   06/14/18  08:29    


 


MPV  7.7 fL (7.2-11.7)   06/14/18  08:29    


 


Neut % (Auto)  69.7 % (50.0-75.0)   06/14/18  08:29    


 


Lymph % (Auto)  19.5 % (20.0-40.0)  L  06/14/18  08:29    


 


Mono % (Auto)  7.1 % (0.0-10.0)   06/14/18  08:29    


 


Eos % (Auto)  3.2 % (0.0-4.0)   06/14/18  08:29    


 


Baso % (Auto)  0.5 % (0.0-2.0)   06/14/18  08:29    


 


Neut # (Auto)  4.0 K/uL (1.8-7.0)   06/14/18  08:29    


 


Lymph # (Auto)  1.1 K/uL (1.0-4.3)   06/14/18  08:29    


 


Mono # (Auto)  0.4 K/uL (0.0-0.8)   06/14/18  08:29    


 


Eos # (Auto)  0.2 K/uL (0.0-0.7)   06/14/18  08:29    


 


Baso # (Auto)  0.0 K/uL (0.0-0.2)   06/14/18  08:29    


 


Neutrophils % (Manual)  84 % (50-75)  H  06/13/18  11:22    


 


Lymphocytes % (Manual)  8 % (20-40)  L  06/13/18  11:22    


 


Monocytes % (Manual)  8 % (0-10)   06/13/18  11:22    


 


Platelet Estimate  Normal  (NORMAL)   06/13/18  11:22    


 


Hypochromasia (manual)  Slight   06/13/18  11:22    


 


Poikilocytosis (manual  Slight   06/13/18  11:22    


 


Anisocytosis (manual)  Slight   06/13/18  11:22    


 


Sodium  141 mmol/L (132-148)   06/14/18  08:29    


 


Potassium  3.7 mmol/L (3.6-5.2)   06/14/18  08:29    


 


Chloride  105 mmol/L ()   06/14/18  08:29    


 


Carbon Dioxide  27 mmol/L (22-30)   06/14/18  08:29    


 


Anion Gap  13  (10-20)   06/14/18  08:29    


 


BUN  11 mg/dL (7-17)   06/14/18  08:29    


 


Creatinine  0.8 mg/dL (0.7-1.2)   06/14/18  08:29    


 


Est GFR ( Amer)  > 60   06/14/18  08:29    


 


Est GFR (Non-Af Amer)  > 60   06/14/18  08:29    


 


Random Glucose  88 mg/dL ()   06/14/18  08:29    


 


Hemoglobin A1c  5.6 % (4.2-6.5)   06/14/18  08:29    


 


Calcium  8.7 mg/dl (8.6-10.4)   06/14/18  08:29    


 


Phosphorus  3.3 mg/dL (2.5-4.5)   06/14/18  08:29    


 


Magnesium  1.9 mg/dL (1.6-2.3)   06/14/18  08:29    


 


Total Bilirubin  0.7 mg/dL (0.2-1.3)   06/14/18  08:29    


 


AST  22 U/L (14-36)   06/14/18  08:29    


 


ALT  21 U/L (9-52)   06/14/18  08:29    


 


Alkaline Phosphatase  76 U/L ()   06/14/18  08:29    


 


Total Protein  6.2 g/dL (6.3-8.3)  L  06/14/18  08:29    


 


Albumin  3.5 g/dL (3.5-5.0)   06/14/18  08:29    


 


Globulin  2.7 gm/dL (2.2-3.9)   06/14/18  08:29    


 


Albumin/Globulin Ratio  1.3  (1.0-2.1)   06/14/18  08:29    


 


Lipase  121 U/L ()   06/13/18  11:22    


 


Urine Color  Yellow  (YELLOW)   06/13/18  09:56    


 


Urine Clarity  Hazy  (Clear)   06/13/18  09:56    


 


Urine pH  6.0  (5.0-8.0)   06/13/18  09:56    


 


Ur Specific Gravity  1.018  (1.003-1.030)   06/13/18  09:56    


 


Urine Protein  Negative mg/dL (NEGATIVE)   06/13/18  09:56    


 


Urine Glucose (UA)  Normal mg/dL (Normal)   06/13/18  09:56    


 


Urine Ketones  Negative mg/dL (NEGATIVE)   06/13/18  09:56    


 


Urine Blood  1+  (NEGATIVE)  H  06/13/18  09:56    


 


Urine Nitrate  Positive  (NEGATIVE)  H  06/13/18  09:56    


 


Urine Bilirubin  Negative  (NEGATIVE)   06/13/18  09:56    


 


Urine Urobilinogen  Normal mg/dL (0.2-1.0)   06/13/18  09:56    


 


Ur Leukocyte Esterase  3+ Mick/uL (Negative)  H  06/13/18  09:56    


 


Urine WBC (Auto)  87 /hpf (0-5)  H  06/13/18  09:56    


 


Urine RBC (Auto)  3 /hpf (0-3)   06/13/18  09:56    


 


Ur Squamous Epith Cells  11 /hpf (0-5)  H  06/13/18  09:56    


 


Urine Bacteria  Many  (<OCC)  H  06/13/18  09:56    














- Hospital Course


Hospital Course: 





63F presented to [facility] for abdominal pain which resolved with 

antibiotics and pain medications. CT concerning for appendicitis. However due 

to resolution of pain patient treated conservatively. Will go home on PO 

antibiotics.





Discharge Exam





- Head Exam


Head Exam: ATRAUMATIC, NORMAL INSPECTION, NORMOCEPHALIC





- Respiratory Exam


Respiratory Exam: NORMAL BREATHING PATTERN, UNREMARKABLE





- Cardiovascular Exam


Cardiovascular Exam: REGULAR RHYTHM, +S1, +S2





- GI/Abdominal Exam


GI & Abdominal Exam: Soft.  absent: Distended, Firm, Guarding, Rebound, Rigid, 

Tenderness





- Neurological Exam


Neurological exam: Alert, Oriented x3





- Psychiatric Exam


Psychiatric exam: Normal Affect, Normal Mood





- Skin


Skin Exam: Dry, Intact, Normal Color, Warm





Discharge Plan





- Discharge Medications


Prescriptions: 


Levofloxacin [Levaquin] 750 mg PO DAILY #7 tablet


Lisinopril/Hydrochlorothiazide [Lisinopril-Hctz 20-25 mg Tab] 1 tab PO DAILY #

30 tablet


Metronidazole [Flagyl] 500 mg PO TID #21 tablet





- Follow Up Plan


Condition: GOOD


Disposition: HOME/ ROUTINE


Instructions:  Appendicitis, Adult (DC), Lisinopril and Hydrochlorothiazide


Additional Instructions: 


1) Please follow up with Dr. Armas in 1-2 weeks


2) Please follow up in the [facility]


3) Please take medications as directed


4) Please return to ED for increase abdominal pain, or any emergencies


Referrals: 


Chantal Cain MD [Staff Provider] -

## 2018-06-14 NOTE — RAD
HISTORY:

latent TB  



COMPARISON:

3/3/2014 



FINDINGS:



LUNGS:

No active pulmonary disease.



PLEURA:

No significant pleural effusion identified, no pneumothorax apparent.



CARDIOVASCULAR:

Normal.



OSSEOUS STRUCTURES:

Mild bilateral shoulder arthrosis



Thoracic spondylosis.



VISUALIZED UPPER ABDOMEN:

Contrast in bowel left upper quadrant compatible with recent CT oral 

administered contrast 



Right upper quadrant cholecystectomy clips 



OTHER FINDINGS:

None.



IMPRESSION:

No active disease.

## 2018-06-14 NOTE — CP.PCM.PN
<Abadier,Michael - Last Filed: 06/14/18 15:34>





Subjective





- Date & Time of Evaluation


Date of Evaluation: 06/14/18


Time of Evaluation: 10:45





- Subjective


Subjective: 





Patient seen and examined at [facility]. Doing well. Still complaining of 

generalized abdominal pain. Hungry. No other complaints at this time. Denies 

any nausea, vomiting , diarrhea or fevers/chills. Ambulating w/o difficultly





Objective





- Vital Signs/Intake and Output


Vital Signs (last 24 hours): 


 











Temp Pulse Resp BP Pulse Ox


 


 98.1 F   60   20   104/66   97 


 


 06/14/18 08:04  06/14/18 08:04  06/14/18 08:04  06/14/18 08:04  06/14/18 08:04








Intake and Output: 


 











 06/14/18 06/14/18





 06:59 18:59


 


Intake Total  1200


 


Balance  1200














- Medications


Medications: 


 Current Medications





Hydrochlorothiazide (Hydrodiuril)  25 mg PO DAILY [facility]


Sodium Chloride (Sodium Chloride 0.9%)  1,000 mls @ 150 mls/hr IV .Q6H40M [facility]


   Last Admin: 06/14/18 05:44 Dose:  150 mls/hr


Ciprofloxacin (Cipro 400mg/200ml Dsw)  400 mg in 200 mls @ 133 mls/hr IVPB Q12H 

[facility]


   PRN Reason: Protocol


   Last Admin: 06/14/18 05:40 Dose:  133 mls/hr


Metronidazole (Flagyl)  500 mg in 100 mls @ 100 mls/hr IVPB Q8H [facility]


   PRN Reason: Protocol


   Last Admin: 06/14/18 01:23 Dose:  100 mls/hr


Lisinopril (Zestril)  20 mg PO DAILY [facility]


Morphine Sulfate (Morphine)  2 mg IVP Q4 PRN


   PRN Reason: Pain, moderate (4-7)


Rosuvastatin Calcium (Crestor)  20 mg PO HS [facility]


   Last Admin: 06/13/18 21:50 Dose:  20 mg











- Labs


Labs: 


 





 06/14/18 08:29 





 06/14/18 08:29 











- Constitutional


Appears: Well





- Head Exam


Head Exam: ATRAUMATIC, NORMAL INSPECTION, NORMOCEPHALIC





- Eye Exam


Eye Exam: EOMI, Normal appearance, PERRL


Pupil Exam: NORMAL ACCOMODATION, PERRL





- ENT Exam


ENT Exam: Mucous Membranes Moist, Normal Exam





- Neck Exam


Neck Exam: Full ROM, Normal Inspection.  absent: Lymphadenopathy





- Respiratory Exam


Respiratory Exam: Clear to Ausculation Bilateral, NORMAL BREATHING PATTERN





- Cardiovascular Exam


Cardiovascular Exam: REGULAR RHYTHM, +S1, +S2.  absent: Murmur





- GI/Abdominal Exam


GI & Abdominal Exam: Soft, Tenderness (diffuse abdominal tenderness, no rebound 

or gaurding. Patient is more tender in the RLQ than the rest of the abdomen. ), 

Normal Bowel Sounds





- Extremities Exam


Extremities Exam: Full ROM, Normal Capillary Refill, Normal Inspection.  absent

: Joint Swelling, Pedal Edema





- Back Exam


Back Exam: NORMAL INSPECTION





- Neurological Exam


Neurological Exam: Alert, Awake, CN II-XII Intact, Normal Gait, Oriented x3





- Psychiatric Exam


Psychiatric exam: Normal Affect, Normal Mood





- Skin


Skin Exam: Dry, Intact, Normal Color, Warm





Assessment and Plan





- Assessment and Plan (Free Text)


Assessment: 





(1) Acute appendicitis without peritonitis


Assessment and Plan: 


Surgeon is Dr. Armas. We will start Cipro/flagyl at this time as evidence 

points to decrease length of stay and decrease morbidity in patient with acute 

intrabdominal infections when compared to Zosyn. Continue to hydrate the 

patient. NS @ 150 is appropriate at this time. In terms of medical optimization 

for surgery patient is under 65, has no history of bleeding, CAD, CHF, COPD, CKD

, DM. Patient did have a remote history of pre DM and was treated with diet 

control, Metabolic surgery in the form of gastric bypass and metformin. From a 

medical standpoint patient is medically optimized for an abdominal surgery if 

this kind and is a low to moderate risk of perioperative complications. Please 

let us know if you have any other questions. 


Status: Acute   Priority: High   





(2) Hypertension


Assessment and Plan: 


patients pressure is controlled. Continue home medications at this time. 


Status: Acute   





Patient complaining of increasing lower extremity swelling of the R. Leg. 

Follow up doppler of the lower extremity. Patient has History of latent TB and 

is currently being treated for this with INH and B6. She has appointment for 

follow up scheduled at the chest clinic. I called the clinic to make sure she 

is scheduled. I did not get an answer. I will call again to confirm tomorrow.  








<Rio Denson - Last Filed: 06/15/18 19:38>





Objective





- Vital Signs/Intake and Output


Vital Signs (last 24 hours): 


 











Temp Pulse Resp BP Pulse Ox


 


 98.1 F   60   20   116/66   96 


 


 06/14/18 16:00  06/14/18 16:00  06/14/18 16:00  06/14/18 16:00  06/14/18 16:00











- Labs


Labs: 


 





 06/14/18 08:29 





 06/14/18 08:29 











Attending/Attestation





- Attestation


I have personally seen and examined this patient.: Yes


I have fully participated in the care of the patient.: Yes


I have reviewed all pertinent clinical information, including history, physical 

exam and plan: Yes


Notes (Text): 





06/15/18 19:37


Patient was seen and examined shortly after resident.





Exam, assessment and plan were discussed with Dr. M Abadier.





Patient is also currently being treated by [facility] for 

latent TB.





Rio Denson D.O.

## 2018-06-15 ENCOUNTER — HOSPITAL ENCOUNTER (OUTPATIENT)
Dept: HOSPITAL 14 - H.ER | Age: 63
Setting detail: OBSERVATION
LOS: 2 days | Discharge: HOME | End: 2018-06-17
Attending: INTERNAL MEDICINE | Admitting: INTERNAL MEDICINE
Payer: COMMERCIAL

## 2018-06-15 VITALS — BODY MASS INDEX: 43.3 KG/M2

## 2018-06-15 DIAGNOSIS — M81.0: ICD-10-CM

## 2018-06-15 DIAGNOSIS — I10: ICD-10-CM

## 2018-06-15 DIAGNOSIS — E11.9: ICD-10-CM

## 2018-06-15 DIAGNOSIS — Z98.84: ICD-10-CM

## 2018-06-15 DIAGNOSIS — Z88.6: ICD-10-CM

## 2018-06-15 DIAGNOSIS — K29.70: ICD-10-CM

## 2018-06-15 DIAGNOSIS — E78.5: ICD-10-CM

## 2018-06-15 DIAGNOSIS — K25.9: ICD-10-CM

## 2018-06-15 DIAGNOSIS — M19.90: ICD-10-CM

## 2018-06-15 DIAGNOSIS — E66.01: ICD-10-CM

## 2018-06-15 DIAGNOSIS — K85.90: Primary | ICD-10-CM

## 2018-06-15 DIAGNOSIS — E78.00: ICD-10-CM

## 2018-06-15 LAB
ALBUMIN SERPL-MCNC: 4.1 G/DL (ref 3.5–5)
ALBUMIN/GLOB SERPL: 1.2 {RATIO} (ref 1–2.1)
ALT SERPL-CCNC: 27 U/L (ref 9–52)
AST SERPL-CCNC: 36 U/L (ref 14–36)
BASOPHILS # BLD AUTO: 0 K/UL (ref 0–0.2)
BASOPHILS NFR BLD: 0.6 % (ref 0–2)
BUN SERPL-MCNC: 14 MG/DL (ref 7–17)
CALCIUM SERPL-MCNC: 9.3 MG/DL (ref 8.4–10.2)
EOSINOPHIL # BLD AUTO: 0.2 K/UL (ref 0–0.7)
EOSINOPHIL NFR BLD: 2.6 % (ref 0–4)
ERYTHROCYTE [DISTWIDTH] IN BLOOD BY AUTOMATED COUNT: 14.4 % (ref 11.5–14.5)
GFR NON-AFRICAN AMERICAN: > 60
HGB BLD-MCNC: 12.3 G/DL (ref 12–16)
LIPASE SERPL-CCNC: 396 U/L (ref 23–300)
LYMPHOCYTES # BLD AUTO: 1.5 K/UL (ref 1–4.3)
LYMPHOCYTES NFR BLD AUTO: 20 % (ref 20–40)
MCH RBC QN AUTO: 29.6 PG (ref 27–31)
MCHC RBC AUTO-ENTMCNC: 32.5 G/DL (ref 33–37)
MCV RBC AUTO: 91.2 FL (ref 81–99)
MONOCYTES # BLD: 0.5 K/UL (ref 0–0.8)
MONOCYTES NFR BLD: 6.3 % (ref 0–10)
NEUTROPHILS # BLD: 5.3 K/UL (ref 1.8–7)
NEUTROPHILS NFR BLD AUTO: 70.5 % (ref 50–75)
NRBC BLD AUTO-RTO: 0.1 % (ref 0–0)
PLATELET # BLD: 292 K/UL (ref 130–400)
PMV BLD AUTO: 7.9 FL (ref 7.2–11.7)
RBC # BLD AUTO: 4.16 MIL/UL (ref 3.8–5.2)
WBC # BLD AUTO: 7.5 K/UL (ref 4.8–10.8)

## 2018-06-15 PROCEDURE — 82607 VITAMIN B-12: CPT

## 2018-06-15 PROCEDURE — 83690 ASSAY OF LIPASE: CPT

## 2018-06-15 PROCEDURE — 85025 COMPLETE CBC W/AUTO DIFF WBC: CPT

## 2018-06-15 PROCEDURE — 36415 COLL VENOUS BLD VENIPUNCTURE: CPT

## 2018-06-15 PROCEDURE — 85027 COMPLETE CBC AUTOMATED: CPT

## 2018-06-15 PROCEDURE — 99285 EMERGENCY DEPT VISIT HI MDM: CPT

## 2018-06-15 PROCEDURE — 82150 ASSAY OF AMYLASE: CPT

## 2018-06-15 PROCEDURE — 84443 ASSAY THYROID STIM HORMONE: CPT

## 2018-06-15 PROCEDURE — 96372 THER/PROPH/DIAG INJ SC/IM: CPT

## 2018-06-15 PROCEDURE — 80053 COMPREHEN METABOLIC PANEL: CPT

## 2018-06-15 PROCEDURE — 80061 LIPID PANEL: CPT

## 2018-06-15 PROCEDURE — 76705 ECHO EXAM OF ABDOMEN: CPT

## 2018-06-15 RX ADMIN — DEXTROSE AND SODIUM CHLORIDE SCH MLS/HR: 5; 900 INJECTION, SOLUTION INTRAVENOUS at 20:57

## 2018-06-15 NOTE — CP.PCM.CON
History of Present Illness





- History of Present Illness


History of Present Illness: 





Surgery Consult note





Reason for consult: previously seen acute appendicitis





63F w/ pmhx significant for morbid obesity s/p gastric bypass 8 years ago, HTN, 

HLD was previously admitted to [facility] for Acute appendicitis. Patients 

abd pain resolved, however had continued nausea. during that time patient was 

treated with IVAbx. She presents to [facility] with continued nausea nad mid-

epigastric pain, no associated vomiting and minimal generalized abdominal pain. 

She states having normal BM, however unable to eat. Denies recent foreign 

travel or sick contacts


Denies: fevers, chills, chest pain, shortness of breath, vomiting, diarrhea, 

changes in urinary or bowel habits





PMH: stated above


PSH: gastric bypass 8 years ago


ALL: aspirin


SocialHx: denies etoh, tobacco, recreational drug use


FH: unremarkable








Review of Systems





- Review of Systems


All systems: reviewed and no additional remarkable complaints except





- Constitutional


Constitutional: As Per HPI





Past Patient History





- Past Medical History & Family History


Past Medical History?: Yes





- Past Social History


Alcohol: None


Drugs: Denies





- CARDIAC


Hx Hypercholesterolemia: Yes


Hx Hypertension: Yes





- PULMONARY


Hx Respiratory Disorders: No





- NEUROLOGICAL


Hx Neurological Disorder: No





- HEENT


Hx HEENT Problems: No





- RENAL


Hx Chronic Kidney Disease: No





- ENDOCRINE/METABOLIC


Hx Endocrine Disorders: No





- HEMATOLOGICAL/ONCOLOGICAL


Hx Anemia: Yes





- INTEGUMENTARY


Hx Dermatological Problems: No





- MUSCULOSKELETAL/RHEUMATOLOGICAL


Hx Arthritis: Yes


Hx Osteoporosis: Yes





- GASTROINTESTINAL


Hx Gastritis: Yes





- GENITOURINARY/GYNECOLOGICAL


Hx Genitourinary Disorders: No





- PSYCHIATRIC


Hx Psychophysiologic Disorder: No


Hx Substance Use: No





- SURGICAL HISTORY


Hx Appendectomy: Yes


Hx Cholecystectomy: Yes





- ANESTHESIA


Hx Anesthesia: Yes


Hx Anesthesia Reactions: No


Hx Malignant Hyperthermia: No





Meds


Allergies/Adverse Reactions: 


 Allergies











Allergy/AdvReac Type Severity Reaction Status Date / Time


 


aspirin Allergy  RASH Verified 06/13/18 10:00














- Medications


Medications: 


 Current Medications





Sodium Chloride (Sodium Chloride 0.9%)  1,000 mls @ 100 mls/hr IV .Q10H [facility]


   Stop: 06/16/18 00:34











Physical Exam





- Constitutional


Appears: Non-toxic, No Acute Distress


Additional comments: 





Morbidly obese





- Head Exam


Head Exam: ATRAUMATIC





- Eye Exam


Eye Exam: EOMI





- ENT Exam


ENT Exam: Mucous Membranes Moist





- Respiratory Exam


Respiratory Exam: NORMAL BREATHING PATTERN.  absent: Accessory Muscle Use, 

Respiratory Distress





- Cardiovascular Exam


Cardiovascular Exam: +S1, +S2.  absent: Bradycardia, Tachycardia





- GI/Abdominal Exam


GI & Abdominal Exam: Soft, Tenderness (mid-epigastric).  absent: Firm, Guarding

, Hernia


Additional comments: 





no rebound tenderness


negative mcburnys, rovsings, or psoas sign





- Extremities Exam


Extremities exam: Positive for: normal inspection.  Negative for: calf 

tenderness





- Back Exam


Back exam: absent: CVA tenderness (L), CVA tenderness (R)





- Neurological Exam


Neurological exam: Alert, Oriented x3





- Psychiatric Exam


Psychiatric exam: Normal Affect





- Skin


Skin Exam: Intact, Warm





Results





- Vital Signs


Recent Vital Signs: 


 Last Vital Signs











Temp  98.9 F   06/15/18 14:17


 


Pulse  69   06/15/18 14:17


 


Resp  20   06/15/18 14:17


 


BP  148/117 H  06/15/18 14:17


 


Pulse Ox  100   06/15/18 16:08














- Labs


Result Diagrams: 


 06/15/18 15:20





 06/15/18 15:20


Labs: 


 Laboratory Results - last 24 hr











  06/15/18 06/15/18





  15:20 15:20


 


WBC  7.5 


 


RBC  4.16 


 


Hgb  12.3 


 


Hct  37.9 


 


MCV  91.2 


 


MCH  29.6 


 


MCHC  32.5 L 


 


RDW  14.4 


 


Plt Count  292 


 


MPV  7.9 


 


Neut % (Auto)  70.5 


 


Lymph % (Auto)  20.0 


 


Mono % (Auto)  6.3 


 


Eos % (Auto)  2.6 


 


Baso % (Auto)  0.6 


 


Neut # (Auto)  5.3 


 


Lymph # (Auto)  1.5 


 


Mono # (Auto)  0.5 


 


Eos # (Auto)  0.2 


 


Baso # (Auto)  0.0 


 


Sodium   138


 


Potassium   4.5


 


Chloride   105


 


Carbon Dioxide   20 L


 


Anion Gap   18


 


BUN   14


 


Creatinine   0.8


 


Est GFR ( Amer)   > 60


 


Est GFR (Non-Af Amer)   > 60


 


Random Glucose   93


 


Calcium   9.3


 


Total Bilirubin   0.9


 


AST   36  D


 


ALT   27


 


Alkaline Phosphatase   83


 


Total Protein   7.6


 


Albumin   4.1


 


Globulin   3.4


 


Albumin/Globulin Ratio   1.2


 


Lipase   396 H














Assessment & Plan





- Assessment and Plan (Free Text)


Assessment: 





63F w/ increased nausea, minimal abdominal pain, recent EGD shown to have 

gastric ulcers.


US: dilated CBD 15mm


Plan: 





recommend GI consult for dilated CBD


PPI


IVF


serial abd exams


will continue to follow


discussed w/ Dr. Voss surgical attending





 PGY1

## 2018-06-15 NOTE — US
HISTORY:

pancreatitis



COMPARISON:

None.



TECHNIQUE:

Sonographic evaluation of the right upper quadrant of the abdomen.



FINDINGS:



LIVER:

Measures 14.9 cm in length. Normal echogenicity of the liver 

parenchyma.  No mass. No intrahepatic bile duct dilatation. 



GALLBLADDER:

Not identified. Clinically correlate with potential prior 

cholecystectomy. 



COMMON BILE DUCT:

Measures 15.5 mm. No choledocholithiasis appreciated.  This may be a 

function of prior cholecystectomy state however is unclear whether 

the patient had prior cholecystectomy definitively and further 

clinical correlation is recommended as discussed above. Even with 

prior cholecystectomy, this may be abnormal. No prominent 

intrahepatic biliary dilatation is identified however. 



PANCREAS:

The head and tail are obscured by overlying bowel screw gas within 

neck and body unremarkable appearing.  No pancreatic duct dilatation 

is seen as imaged.



RIGHT KIDNEY:

Measures 9.9 cm in length. Normal echogenicity. No calculus, mass, or 

hydronephrosis.



AORTA:

No aneurysmal dilatation.



IVC:

Unremarkable.



OTHER FINDINGS:

None .



IMPRESSION:

Prominent CBD is identified measuring up to 15.5 mm caliber without 

demonstrated choledocholithiasis or prominent intrahepatic bile duct 

dilatation. Pancreatic head is obscured however there is no prominent 

pancreatic duct in the visualized neck and body of the pancreas.  The 

gallbladder is not identified and patient may be status post 

cholecystectomy in the past. CBD dilatation may be a function of 

prior cholecystectomy though 15.5 mm dilatation is still relatively 

prominent even post cholecystectomy. Clinically correlate further.

## 2018-06-15 NOTE — ED PDOC
HPI: Abdomen


Time Seen by Provider: 06/15/18 14:09


Chief Complaint (Nursing): Abdominal Pain


Chief Complaint (Provider): Abdominal Pain


History Per: Patient


History/Exam Limitations: no limitations


Onset/Duration Of Symptoms: Days (x4)


Current Symptoms Are (Timing): Still Present


Location Of Pain/Discomfort: Epigastric


Associated Symptoms: Nausea.  denies: Fever, Diarrhea


Additional Complaint(s): 





63 year old female, with a past medical history of gastritis and HTN, presented 

to the ED complaining of epigastric pain associated with nausea with onset of 4 

days.  Patient reports he was admitted into [facility] 3 days ago with a 

CT scan suggestive of appendicitis.  Patient had no pain or RLQ tenderness at 

the time and surgeon opted for observation.  She presents to the ED today with 

no RLQ pain or tenderness.  She denies fever, hematochezia, or diarrhea. 





PCP: Stefany Porter





Past Medical History


Reviewed: Historical Data, Nursing Documentation, Vital Signs


Vital Signs: 


 Last Vital Signs











Temp  98.9 F   06/15/18 14:17


 


Pulse  69   06/15/18 14:17


 


Resp  20   06/15/18 14:17


 


BP  148/117 H  06/15/18 14:17


 


Pulse Ox  100   06/15/18 14:50














- Medical History


PMH: Anemia, Arthritis, Diabetes, Gastritis, HTN, Hypercholesterolemia, 

Osteoporosis


   Denies: Chronic Kidney Disease





- Surgical History


Surgical History: Appendectomy, Cholecystectomy





- Family History


Family History: States: Unknown Family Hx





- Social History


Current smoker - smoking cessation education provided: No


Alcohol: None


Drugs: Denies





- Immunization History


Hx Tetanus Toxoid Vaccination: No


Hx Influenza Vaccination: No


Hx Pneumococcal Vaccination: No





- Home Medications


Home Medications: 


 Ambulatory Orders











 Medication  Instructions  Recorded


 


Dicyclomine [Bentyl] 10 mg PO DAILY 01/18/18


 


Metoclopramide [Reglan] 5 mg PO TID PRN 01/18/18


 


Pravastatin Sodium 40 mg PO DAILY 01/18/18


 


Omeprazole 40 mg PO DAILY #30 capsule. 01/20/18


 


Sucralfate [Carafate] 1 gm PO TID #90 tablet 01/20/18


 


Isoniazid [Niazid] 300 mg PO DAILY 06/13/18


 


Levofloxacin [Levaquin] 750 mg PO DAILY #7 tablet 06/14/18


 


Lisinopril/Hydrochlorothiazide 1 tab PO DAILY #30 tablet 06/14/18





[Lisinopril-Hctz 20-25 mg Tab]  


 


Metronidazole [Flagyl] 500 mg PO TID #21 tablet 06/14/18














- Allergies


Allergies/Adverse Reactions: 


 Allergies











Allergy/AdvReac Type Severity Reaction Status Date / Time


 


aspirin Allergy  RASH Verified 06/13/18 10:00














Review of Systems


ROS Statement: Except As Marked, All Systems Reviewed And Found Negative


Constitutional: Negative for: Fever


Gastrointestinal: Positive for: Abdominal Pain (epigastric pain).  Negative for

: Hematochezia


Genitourinary Female: Negative for: Dysuria





Physical Exam





- Reviewed


Nursing Documentation Reviewed: Yes


Vital Signs Reviewed: Yes





- Physical Exam


Appears: Positive for: Non-toxic, No Acute Distress


Head Exam: Positive for: ATRAUMATIC, NORMOCEPHALIC


Skin: Positive for: Normal Color, Warm, Dry


Eye Exam: Positive for: Normal appearance


Neck: Positive for: Normal, Painless ROM


Cardiovascular/Chest: Positive for: Regular Rate, Rhythm.  Negative for: Murmur


Respiratory: Positive for: Normal Breath Sounds.  Negative for: Wheezing, 

Respiratory Distress


Gastrointestinal/Abdominal: Positive for: Tenderness (Epigastric and RUQ 

tenderness).  Negative for: Guarding, Rebound, Other (RLQ tenderness)


Back: Positive for: Normal Inspection.  Negative for: L CVA Tenderness, R CVA 

Tenderness


Extremity: Positive for: Normal ROM.  Negative for: Tenderness, Swelling


Neurologic/Psych: Positive for: Alert, Oriented.  Negative for: Motor/Sensory 

Deficits





- Laboratory Results


Result Diagrams: 


 06/15/18 15:20





 06/15/18 15:20





- ECG


O2 Sat by Pulse Oximetry: 100 (RA)


Pulse Ox Interpretation: Normal





Medical Decision Making


Medical Decision Making: 





Initial Impression: Epigastric pain





Initial Plan: 


CMP


Lipase


ED urine dipstick


CBC


Bentyl 10mg PO


Sodium chloride 1000mL IV


Pepcid 20mg IV


Famotidine 20mg in 50mL IV





--------------------------------------------------------------------------------

-----------------


Scribe Attestation:


Documented by Danial Pryor acting as a scribe for Facundo Grajeda MD.





Provider Scribe Attestation:


All medical record entries made by the Scribe were at my direction and 

personally dictated by me. I have reviewed the chart and agree that the record 

accurately reflects my personal performance of the history, physical exam, 

medical decision making, and the department course for this patient. I have 

also personally directed, reviewed, and agree with the discharge instructions 

and disposition.





Disposition





- Clinical Impression


Clinical Impression: 


 Pancreatitis








- Patient ED Disposition


Is Patient to be Admitted: Yes





- Disposition


Disposition Time: 16:07


Condition: FAIR


Forms:  [facility] (English)





- Pt Status Changed To:


Hospital Disposition Of: Observation





- POA


Present On Arrival: None

## 2018-06-16 VITALS — RESPIRATION RATE: 20 BRPM

## 2018-06-16 LAB
ALBUMIN SERPL-MCNC: 3.5 G/DL (ref 3.5–5)
ALBUMIN/GLOB SERPL: 1.1 {RATIO} (ref 1–2.1)
ALT SERPL-CCNC: 30 U/L (ref 9–52)
AST SERPL-CCNC: 40 U/L (ref 14–36)
BUN SERPL-MCNC: 10 MG/DL (ref 7–17)
CALCIUM SERPL-MCNC: 8.6 MG/DL (ref 8.4–10.2)
ERYTHROCYTE [DISTWIDTH] IN BLOOD BY AUTOMATED COUNT: 14.3 % (ref 11.5–14.5)
GFR NON-AFRICAN AMERICAN: > 60
HDLC SERPL-MCNC: 49 MG/DL (ref 30–70)
HGB BLD-MCNC: 11.1 G/DL (ref 12–16)
LDLC SERPL-MCNC: 50 MG/DL (ref 0–129)
MCH RBC QN AUTO: 29.5 PG (ref 27–31)
MCHC RBC AUTO-ENTMCNC: 32.8 G/DL (ref 33–37)
MCV RBC AUTO: 89.9 FL (ref 81–99)
PLATELET # BLD: 296 K/UL (ref 130–400)
RBC # BLD AUTO: 3.77 MIL/UL (ref 3.8–5.2)
VIT B12 SERPL-MCNC: 495 PG/ML (ref 239–931)
WBC # BLD AUTO: 5.1 K/UL (ref 4.8–10.8)

## 2018-06-16 RX ADMIN — DEXTROSE AND SODIUM CHLORIDE SCH MLS/HR: 5; 900 INJECTION, SOLUTION INTRAVENOUS at 11:04

## 2018-06-16 RX ADMIN — DEXTROSE AND SODIUM CHLORIDE SCH MLS/HR: 5; 900 INJECTION, SOLUTION INTRAVENOUS at 17:11

## 2018-06-16 RX ADMIN — PANTOPRAZOLE SODIUM SCH MG: 40 TABLET, DELAYED RELEASE ORAL at 11:22

## 2018-06-16 RX ADMIN — DEXTROSE AND SODIUM CHLORIDE SCH: 5; 900 INJECTION, SOLUTION INTRAVENOUS at 04:53

## 2018-06-16 RX ADMIN — ENOXAPARIN SODIUM SCH MG: 40 INJECTION SUBCUTANEOUS at 11:14

## 2018-06-16 RX ADMIN — FAMOTIDINE SCH MLS/HR: 20 INJECTION, SOLUTION INTRAVENOUS at 11:15

## 2018-06-16 NOTE — HP
CHIEF COMPLAINT:  Abdominal pain.



HISTORY OF PRESENT ILLNESS:  This is 63-year-old female who was recently

admitted to [facility] and workup showed possible appendicitis, and

the patient was discharged from the hospital.  The patient continued to

have abdominal pain, which got worse.  So, the patient was brought to

emergency room and found to have acute pancreatitis and was admitted for

further management.



REVIEW OF SYSTEMS:  Positive for abdominal pain.  Review of system

otherwise is negative for headache, dizziness, syncope, loss of

consciousness, chest pain, shortness of breath, nausea, vomiting, diarrhea,

constipation, any new joint or extremity pain.  Review of systems of all

other organ system is unremarkable.



PAST MEDICAL HISTORY:  Significant for hypertension, elevated cholesterol,

osteoporosis, anemia, arthritis.



PAST SURGICAL HISTORY:  Remarkable for gastric bypass surgery and

appendectomy.



PERSONAL HISTORY:  Patient is currently nonsmoker, nondrinker.  No

substance abuse.



MEDICATIONS:  The patient is on multiple medications, which is as per

reconciliation sheet, which was reviewed and ordered.



ALLERGIES:  THE PATIENT IS ALLERGIC TO ASPIRIN.



FAMILY HISTORY:  Noncontributory.



PHYSICAL EXAMINATION:

GENERAL:  A well-built, well-nourished, morbidly obese 63-year-old female

in no acute distress.

VITAL SIGNS:  Temperature 98.1, pulse 60, respirations 19, blood pressure

120/76.

HEENT:  Pupils reacting to light.  No JVD.  No thyromegaly.  No

lymphadenopathy.  No nystagmus.  Normocephalic, atraumatic skull.

HEART:  S1 and S2, normal and regular.  No significant murmur, gallop or

rub is heard.

LUNGS:  Show good bilateral air exchange.  No rales or rhonchi.

ABDOMEN:  Soft and nontender.  No organomegaly.  No fluid.  No sign of

acute abdomen.  No guarding.  No rigidity.  No rebound.   Bowel sounds are

plus and normal.

EXTREMITIES:  No edema.  No calf swelling.  No tenderness.  No acute

ischemia.

CNS:  Essentially unchanged.



DIAGNOSTIC DATA:  Available diagnostic data reviewed.  WBC 7.5, hemoglobin

12.3, hematocrit 37.9, and platelets 292.  Sodium 138, potassium 4.5,

chloride 105, bicarb 20, BUN 14, and creatinine 0.8.  Lipase level is 396. 

Abdominal sonogram shows dilated _____ duct.



ADMITTING IMPRESSION:  Acute pancreatitis and morbid obesity with body mass

index of 43.







PLAN:  As ordered.  Case and plan discussed with the patient.







__________________________________________

Gamaliel Huertas MD



DD:  06/16/2018 8:10:28

DT:  06/16/2018 13:55:49

Job # [id number]

## 2018-06-16 NOTE — CP.PCM.PN
Subjective





- Date & Time of Evaluation


Date of Evaluation: 06/16/18


Time of Evaluation: 06:30





- Subjective


Subjective: 





Surgery- Dr. Voss





Patient seen and examined at bedside this AM. Has continued pain mid-

epigastrum. Pain better than yesterday. Denies RLQ pain. No nausea, vomiting, 

chest pain, shortness of breath, changes in BM





Objective





- Vital Signs/Intake and Output


Vital Signs (last 24 hours): 


 











Temp Pulse Resp BP Pulse Ox


 


 98.1 F   55 L  20   104/64   97 


 


 06/16/18 08:24  06/16/18 08:24  06/16/18 08:24  06/16/18 08:24  06/16/18 08:24











- Medications


Medications: 


 Current Medications





Acetaminophen/Codeine Phosphate (Tylenol/Codeine 300 Mg/30 Mg)  1 tab PO Q6 PRN


   PRN Reason: Pain, moderate (4-7)


   Last Admin: 06/15/18 20:32 Dose:  1 tab


Dicyclomine HCl (Bentyl)  10 mg PO BID [facility]


Enoxaparin Sodium (Lovenox)  40 mg SC DAILY [facility]


   PRN Reason: Protocol


Dextrose/Sodium Chloride (Dextrose 5%/0.9% Ns 1000 Ml)  1,000 mls @ 150 mls/hr 

IV .Q6H40M [facility]


   Stop: 06/16/18 20:21


   Last Admin: 06/16/18 04:53 Dose:  Not Given


Famotidine (Pepcid 20mg/50ml Premix)  20 mg in 50 mls @ 100 mls/hr IVPB DAILY 

[facility]


Isoniazid (Niazid)  300 mg PO DAILY [facility]


   PRN Reason: Protocol


Levofloxacin (Levaquin)  750 mg PO DAILY [facility]


   PRN Reason: Protocol


Metoclopramide HCl (Reglan)  5 mg PO BID [facility]


Ondansetron HCl (Zofran Odt)  4 mg PO Q4 PRN


   PRN Reason: Nausea/Vomiting


Pantoprazole Sodium (Protonix Ec Tab)  40 mg PO DAILY [facility]


Pravastatin Sodium (Pravachol)  40 mg PO DAILY [facility]


Sucralfate (Carafate Tab)  1 gm PO TID [facility]











- Labs


Labs: 


 





 06/15/18 15:20 





 06/15/18 15:20 











- Constitutional


Appears: Non-toxic, No Acute Distress





- Head Exam


Head Exam: ATRAUMATIC





- Eye Exam


Eye Exam: EOMI.  absent: Scleral icterus





- ENT Exam


ENT Exam: Mucous Membranes Moist





- Respiratory Exam


Respiratory Exam: NORMAL BREATHING PATTERN.  absent: Accessory Muscle Use, 

Respiratory Distress





- GI/Abdominal Exam


GI & Abdominal Exam: Soft, Tenderness (tender to deep palpation mid-epigastrum)





- Extremities Exam


Extremities Exam: Normal Inspection.  absent: Calf Tenderness





- Neurological Exam


Neurological Exam: Alert, Awake, Oriented x3





- Psychiatric Exam


Psychiatric exam: Normal Affect





- Skin


Skin Exam: Intact, Warm





Assessment and Plan





- Assessment and Plan (Free Text)


Assessment: 





63F w/ increased nausea, minimal abdominal pain, recent EGD shown to have 

gastric ulcers.











Plan: 





- f/u GI recs


- f/u AM labs


- PPI


- IVF


- serial abd exams


- will continue to follow


- discussed w/ Dr. Voss surgical attending





 PGY1

## 2018-06-16 NOTE — CP.PCM.CON
<Breanne Liu - Last Filed: 06/16/18 12:25>





History of Present Illness





- History of Present Illness


History of Present Illness: 


GI Fellow PGY4 Consult Note 


This is a 63yF with pmhx of latent TB, morbid obesity s/p gastric bypass 8yrs 

again, HTN, HLD, PUD presenting with complaints of epigastric abdominal pain. 

Pt was just discharged from [facility] one day ago where she was treated 

conservatively for acute appendicitis and her abdominal pain resolved and she 

was discharged on levaquin and flagyl. Per the pt she returned because of mild 

epigastric abdominal pain, no nausea or vomiting.Pt had an EGD with Dr. Tejeda 1/ 2018: Bilroth1, anastomatic ulcer, cratered 10mm, path neg for malignancy or 

H.pylori. Pt denies taking PPI at home. GI was consulted for CBD 15mm and 

possible pancreatitis. 


ROS: A 12pt ROS was negative except as above


PmHX: As stated in hPI


PsHX: As stated in HPI


SHX: denies etog drugs, tobacco


FHx: neg for colon cancer  





Past Patient History





- Past Medical History & Family History


Past Medical History?: Yes





- Past Social History


Alcohol: None


Drugs: Denies





- CARDIAC


Hx Hypercholesterolemia: Yes


Hx Hypertension: Yes





- PULMONARY


Hx Respiratory Disorders: No





- NEUROLOGICAL


Hx Neurological Disorder: No





- HEENT


Hx HEENT Problems: No





- RENAL


Hx Chronic Kidney Disease: No





- ENDOCRINE/METABOLIC


Hx Endocrine Disorders: No





- HEMATOLOGICAL/ONCOLOGICAL


Hx Anemia: Yes





- INTEGUMENTARY


Hx Dermatological Problems: No





- MUSCULOSKELETAL/RHEUMATOLOGICAL


Hx Arthritis: Yes


Hx Osteoporosis: Yes





- GASTROINTESTINAL


Hx Gastritis: Yes





- GENITOURINARY/GYNECOLOGICAL


Hx Genitourinary Disorders: No





- PSYCHIATRIC


Hx Psychophysiologic Disorder: No


Hx Substance Use: No





- SURGICAL HISTORY


Hx Appendectomy: Yes


Hx Cholecystectomy: Yes





- ANESTHESIA


Hx Anesthesia: Yes


Hx Anesthesia Reactions: No


Hx Malignant Hyperthermia: No





Meds


Allergies/Adverse Reactions: 


 Allergies











Allergy/AdvReac Type Severity Reaction Status Date / Time


 


aspirin Allergy  RASH Verified 06/13/18 10:00














- Medications


Medications: 


 Current Medications





Acetaminophen/Codeine Phosphate (Tylenol/Codeine 300 Mg/30 Mg)  1 tab PO Q6 PRN


   PRN Reason: Pain, moderate (4-7)


   Last Admin: 06/15/18 20:32 Dose:  1 tab


Dicyclomine HCl (Bentyl)  10 mg PO BID [facility]


   Last Admin: 06/16/18 11:14 Dose:  10 mg


Enoxaparin Sodium (Lovenox)  40 mg SC DAILY [facility]


   PRN Reason: Protocol


   Last Admin: 06/16/18 11:14 Dose:  40 mg


Dextrose/Sodium Chloride (Dextrose 5%/0.9% Ns 1000 Ml)  1,000 mls @ 150 mls/hr 

IV .Q6H40M [facility]


   Stop: 06/16/18 20:21


   Last Admin: 06/16/18 11:04 Dose:  150 mls/hr


Famotidine (Pepcid 20mg/50ml Premix)  20 mg in 50 mls @ 100 mls/hr IVPB DAILY 

[facility]


   Last Admin: 06/16/18 11:15 Dose:  100 mls/hr


Isoniazid (Niazid)  300 mg PO DAILY [facility]


   PRN Reason: Protocol


   Last Admin: 06/16/18 11:15 Dose:  300 mg


Levofloxacin (Levaquin)  750 mg PO DAILY [facility]


   PRN Reason: Protocol


   Last Admin: 06/16/18 11:14 Dose:  750 mg


Metoclopramide HCl (Reglan)  5 mg PO BID [facility]


   Last Admin: 06/16/18 11:15 Dose:  5 mg


Ondansetron HCl (Zofran Odt)  4 mg PO Q4 PRN


   PRN Reason: Nausea/Vomiting


Pantoprazole Sodium (Protonix Ec Tab)  40 mg PO DAILY [facility]


   Last Admin: 06/16/18 11:22 Dose:  40 mg


Pravastatin Sodium (Pravachol)  40 mg PO DAILY [facility]


   Last Admin: 06/16/18 11:16 Dose:  40 mg


Sucralfate (Carafate Tab)  1 gm PO TID [facility]


   Last Admin: 06/16/18 11:13 Dose:  1 gm











Physical Exam





- Constitutional


Appears: Non-toxic, No Acute Distress





- Head Exam


Head Exam: ATRAUMATIC, NORMAL INSPECTION, NORMOCEPHALIC





- Eye Exam


Eye Exam: EOMI, Normal appearance, PERRL


Pupil Exam: PERRL





- ENT Exam


ENT Exam: Mucous Membranes Moist, Normal Exam





- Neck Exam


Neck exam: Positive for: Normal Inspection





- Respiratory Exam


Respiratory Exam: Clear to Auscultation Bilateral, NORMAL BREATHING PATTERN





- Cardiovascular Exam


Cardiovascular Exam: REGULAR RHYTHM, +S1, +S2





- GI/Abdominal Exam


GI & Abdominal Exam: Normal Bowel Sounds, Soft.  absent: Distended, Organomegaly

, Tenderness





- Rectal Exam


Rectal Exam: Deferred





- Extremities Exam


Extremities exam: Positive for: full ROM, normal inspection





- Neurological Exam


Neurological exam: Alert, Oriented x3





- Psychiatric Exam


Psychiatric exam: Normal Affect, Normal Mood





- Skin


Skin Exam: Dry, Intact, Normal Color, Warm





Results





- Vital Signs


Recent Vital Signs: 


 Last Vital Signs











Temp  98.1 F   06/16/18 08:24


 


Pulse  55 L  06/16/18 08:24


 


Resp  20   06/16/18 08:24


 


BP  104/64   06/16/18 08:24


 


Pulse Ox  97   06/16/18 08:24














- Labs


Result Diagrams: 


 06/16/18 09:05





 06/16/18 09:05


Labs: 


 Laboratory Results - last 24 hr











  06/15/18 06/15/18 06/16/18





  15:20 15:20 09:05


 


WBC  7.5   5.1


 


RBC  4.16   3.77 L


 


Hgb  12.3   11.1 L


 


Hct  37.9   33.9 L


 


MCV  91.2   89.9


 


MCH  29.6   29.5


 


MCHC  32.5 L   32.8 L


 


RDW  14.4   14.3


 


Plt Count  292   296


 


MPV  7.9  


 


Neut % (Auto)  70.5  


 


Lymph % (Auto)  20.0  


 


Mono % (Auto)  6.3  


 


Eos % (Auto)  2.6  


 


Baso % (Auto)  0.6  


 


Neut # (Auto)  5.3  


 


Lymph # (Auto)  1.5  


 


Mono # (Auto)  0.5  


 


Eos # (Auto)  0.2  


 


Baso # (Auto)  0.0  


 


Sodium   138 


 


Potassium   4.5 


 


Chloride   105 


 


Carbon Dioxide   20 L 


 


Anion Gap   18 


 


BUN   14 


 


Creatinine   0.8 


 


Est GFR ( Amer)   > 60 


 


Est GFR (Non-Af Amer)   > 60 


 


Random Glucose   93 


 


Calcium   9.3 


 


Total Bilirubin   0.9 


 


AST   36  D 


 


ALT   27 


 


Alkaline Phosphatase   83 


 


Total Protein   7.6 


 


Albumin   4.1 


 


Globulin   3.4 


 


Albumin/Globulin Ratio   1.2 


 


Triglycerides   


 


Cholesterol   


 


LDL Cholesterol Direct   


 


HDL Cholesterol   


 


Lipase   396 H 


 


Vitamin B12   


 


TSH 3rd Generation   














  06/16/18





  09:05


 


WBC 


 


RBC 


 


Hgb 


 


Hct 


 


MCV 


 


MCH 


 


MCHC 


 


RDW 


 


Plt Count 


 


MPV 


 


Neut % (Auto) 


 


Lymph % (Auto) 


 


Mono % (Auto) 


 


Eos % (Auto) 


 


Baso % (Auto) 


 


Neut # (Auto) 


 


Lymph # (Auto) 


 


Mono # (Auto) 


 


Eos # (Auto) 


 


Baso # (Auto) 


 


Sodium  141


 


Potassium  3.9


 


Chloride  107


 


Carbon Dioxide  25


 


Anion Gap  13


 


BUN  10


 


Creatinine  0.7


 


Est GFR ( Amer)  > 60


 


Est GFR (Non-Af Amer)  > 60


 


Random Glucose  123 H


 


Calcium  8.6


 


Total Bilirubin  0.6


 


AST  40 H


 


ALT  30


 


Alkaline Phosphatase  80


 


Total Protein  6.6


 


Albumin  3.5


 


Globulin  3.1


 


Albumin/Globulin Ratio  1.1


 


Triglycerides  54


 


Cholesterol  126


 


LDL Cholesterol Direct  50


 


HDL Cholesterol  49


 


Lipase 


 


Vitamin B12  495


 


TSH 3rd Generation  3.25














Assessment & Plan





- Assessment and Plan (Free Text)


Assessment: 


This is a 63yF with pmhx of HTN, HLD< obesity s/p gastric bypass, PUD 

presenting with complaints of epigastric abdominal pain.


1. Abdominal pain-improving


2. PUD


3. CBD dilated


4. Elevated lipase 


Plan: 


-Continue supportive care with pain control and antiemetics


-Abdominal pain is minimal, likely from PUD


-EGD 1/2018 with anastamotic ulcer


-Recommend PPI daily and carafate 


-Pt needs to followup outpt with her private GI doc for repeat EGD for ulcer 

healing


-No plan for GI intervention at this time


-CBD dilated s/p cholecystectomy and also from pain medications opioids during 

recent admission, LFTS wnl no signs of obstruction


-No signs of acute pancreatitis, lipase 395 (300 us upper limit of normal) no 

CT imaging findings of pancreatitis, and abdominal pain not consistent of typica

; pancreatitis pain


-From GI perspective pt okay for dc home on abx prescribed by surgery


-Please call with any questions or concerns 








<Josselin Lozano - Last Filed: 06/16/18 15:36>





Meds





- Medications


Medications: 


 Current Medications





Acetaminophen/Codeine Phosphate (Tylenol/Codeine 300 Mg/30 Mg)  1 tab PO Q6 PRN


   PRN Reason: Pain, moderate (4-7)


   Last Admin: 06/15/18 20:32 Dose:  1 tab


Dicyclomine HCl (Bentyl)  10 mg PO BID [facility]


   Last Admin: 06/16/18 11:14 Dose:  10 mg


Enoxaparin Sodium (Lovenox)  40 mg SC DAILY [facility]


   PRN Reason: Protocol


   Last Admin: 06/16/18 11:14 Dose:  40 mg


Dextrose/Sodium Chloride (Dextrose 5%/0.9% Ns 1000 Ml)  1,000 mls @ 150 mls/hr 

IV .Q6H40M [facility]


   Stop: 06/16/18 20:21


   Last Admin: 06/16/18 11:04 Dose:  150 mls/hr


Famotidine (Pepcid 20mg/50ml Premix)  20 mg in 50 mls @ 100 mls/hr IVPB DAILY 

[facility]


   Last Admin: 06/16/18 11:15 Dose:  100 mls/hr


Isoniazid (Niazid)  300 mg PO DAILY [facility]


   PRN Reason: Protocol


   Last Admin: 06/16/18 11:15 Dose:  300 mg


Levofloxacin (Levaquin)  750 mg PO DAILY [facility]


   PRN Reason: Protocol


   Last Admin: 06/16/18 11:14 Dose:  750 mg


Metoclopramide HCl (Reglan)  5 mg PO BID [facility]


   Last Admin: 06/16/18 11:15 Dose:  5 mg


Ondansetron HCl (Zofran Odt)  4 mg PO Q4 PRN


   PRN Reason: Nausea/Vomiting


Pantoprazole Sodium (Protonix Ec Tab)  40 mg PO DAILY [facility]


   Last Admin: 06/16/18 11:22 Dose:  40 mg


Pravastatin Sodium (Pravachol)  40 mg PO DAILY [facility]


   Last Admin: 06/16/18 11:16 Dose:  40 mg


Sucralfate (Carafate Tab)  1 gm PO TID [facility]


   Last Admin: 06/16/18 11:13 Dose:  1 gm











Results





- Vital Signs


Recent Vital Signs: 


 Last Vital Signs











Temp  98.1 F   06/16/18 08:24


 


Pulse  55 L  06/16/18 08:24


 


Resp  20   06/16/18 08:24


 


BP  104/64   06/16/18 08:24


 


Pulse Ox  97   06/16/18 08:24














- Labs


Result Diagrams: 


 06/16/18 09:05





 06/16/18 09:05


Labs: 


 Laboratory Results - last 24 hr











  06/15/18 06/15/18 06/16/18





  15:20 15:20 09:05


 


WBC  7.5   5.1


 


RBC  4.16   3.77 L


 


Hgb  12.3   11.1 L


 


Hct  37.9   33.9 L


 


MCV  91.2   89.9


 


MCH  29.6   29.5


 


MCHC  32.5 L   32.8 L


 


RDW  14.4   14.3


 


Plt Count  292   296


 


MPV  7.9  


 


Neut % (Auto)  70.5  


 


Lymph % (Auto)  20.0  


 


Mono % (Auto)  6.3  


 


Eos % (Auto)  2.6  


 


Baso % (Auto)  0.6  


 


Neut # (Auto)  5.3  


 


Lymph # (Auto)  1.5  


 


Mono # (Auto)  0.5  


 


Eos # (Auto)  0.2  


 


Baso # (Auto)  0.0  


 


Sodium   138 


 


Potassium   4.5 


 


Chloride   105 


 


Carbon Dioxide   20 L 


 


Anion Gap   18 


 


BUN   14 


 


Creatinine   0.8 


 


Est GFR ( Amer)   > 60 


 


Est GFR (Non-Af Amer)   > 60 


 


Random Glucose   93 


 


Calcium   9.3 


 


Total Bilirubin   0.9 


 


AST   36  D 


 


ALT   27 


 


Alkaline Phosphatase   83 


 


Total Protein   7.6 


 


Albumin   4.1 


 


Globulin   3.4 


 


Albumin/Globulin Ratio   1.2 


 


Triglycerides   


 


Cholesterol   


 


LDL Cholesterol Direct   


 


HDL Cholesterol   


 


Lipase   396 H 


 


Vitamin B12   


 


TSH 3rd Generation   














  06/16/18





  09:05


 


WBC 


 


RBC 


 


Hgb 


 


Hct 


 


MCV 


 


MCH 


 


MCHC 


 


RDW 


 


Plt Count 


 


MPV 


 


Neut % (Auto) 


 


Lymph % (Auto) 


 


Mono % (Auto) 


 


Eos % (Auto) 


 


Baso % (Auto) 


 


Neut # (Auto) 


 


Lymph # (Auto) 


 


Mono # (Auto) 


 


Eos # (Auto) 


 


Baso # (Auto) 


 


Sodium  141


 


Potassium  3.9


 


Chloride  107


 


Carbon Dioxide  25


 


Anion Gap  13


 


BUN  10


 


Creatinine  0.7


 


Est GFR ( Amer)  > 60


 


Est GFR (Non-Af Amer)  > 60


 


Random Glucose  123 H


 


Calcium  8.6


 


Total Bilirubin  0.6


 


AST  40 H


 


ALT  30


 


Alkaline Phosphatase  80


 


Total Protein  6.6


 


Albumin  3.5


 


Globulin  3.1


 


Albumin/Globulin Ratio  1.1


 


Triglycerides  54


 


Cholesterol  126


 


LDL Cholesterol Direct  50


 


HDL Cholesterol  49


 


Lipase 


 


Vitamin B12  495


 


TSH 3rd Generation  3.25














Attending/Attestation





- Attestation


I have personally seen and examined this patient.: Yes


I have fully participated in the care of the patient.: Yes


I have reviewed all pertinent clinical information: Yes


Notes (Text): 





06/16/18 15:34


This is a 63 year old F with pmhx of HTN, HLD< obesity s/p gastric bypass, PUD 

presenting with complaints of epigastric abdominal pain s/p EGD 1/2018 showing 

anastomotic clean ulcer. CBD dilated s/p CCY to 15 mm with opioid intake. 

Lipase 395 (borderline high for [facility] lab). No s/s of clinical pancreatitis. 

Recommend continuing PPI. No further work up. Pain resolved today and 

tolearting diet. Thank you for letting us participate in the care of your 

patient

## 2018-06-17 VITALS
TEMPERATURE: 98 F | OXYGEN SATURATION: 96 % | HEART RATE: 54 BPM | SYSTOLIC BLOOD PRESSURE: 124 MMHG | DIASTOLIC BLOOD PRESSURE: 68 MMHG

## 2018-06-17 LAB
ALBUMIN SERPL-MCNC: 3 G/DL (ref 3.5–5)
ALBUMIN/GLOB SERPL: 1.1 {RATIO} (ref 1–2.1)
ALT SERPL-CCNC: 26 U/L (ref 9–52)
AMYLASE SERPL-CCNC: 74 U/L (ref 30–110)
AST SERPL-CCNC: 27 U/L (ref 14–36)
BUN SERPL-MCNC: 11 MG/DL (ref 7–17)
CALCIUM SERPL-MCNC: 8.3 MG/DL (ref 8.4–10.2)
GFR NON-AFRICAN AMERICAN: > 60
LIPASE SERPL-CCNC: 67 U/L (ref 23–300)

## 2018-06-17 RX ADMIN — ENOXAPARIN SODIUM SCH MG: 40 INJECTION SUBCUTANEOUS at 09:05

## 2018-06-17 RX ADMIN — FAMOTIDINE SCH MLS/HR: 20 INJECTION, SOLUTION INTRAVENOUS at 09:05

## 2018-06-17 RX ADMIN — PANTOPRAZOLE SODIUM SCH MG: 40 TABLET, DELAYED RELEASE ORAL at 09:07

## 2018-06-17 NOTE — CP.PCM.PN
<Sarbjit Hidalgo - Last Filed: 06/17/18 08:47>





Subjective





- Date & Time of Evaluation


Date of Evaluation: 06/17/18


Time of Evaluation: 07:00





- Subjective


Subjective: 





Surgery- Dr. Ash





Patient seen and examined at bedside this AM. pt feeling better. Tolerating 

regular diet without abd pain. Denies nausea, vomiting. 





Objective





- Vital Signs/Intake and Output


Vital Signs (last 24 hours): 


 











Temp Pulse Resp BP Pulse Ox


 


 98.6 F   66   20   113/72   97 


 


 06/17/18 00:35  06/17/18 00:35  06/17/18 00:35  06/17/18 00:35  06/17/18 00:35











- Medications


Medications: 


 Current Medications





Acetaminophen/Codeine Phosphate (Tylenol/Codeine 300 Mg/30 Mg)  1 tab PO Q6 PRN


   PRN Reason: Pain, moderate (4-7)


   Last Admin: 06/15/18 20:32 Dose:  1 tab


Dicyclomine HCl (Bentyl)  10 mg PO BID [facility]


   Last Admin: 06/16/18 17:11 Dose:  10 mg


Enoxaparin Sodium (Lovenox)  40 mg SC DAILY [facility]


   PRN Reason: Protocol


   Last Admin: 06/16/18 11:14 Dose:  40 mg


Famotidine (Pepcid 20mg/50ml Premix)  20 mg in 50 mls @ 100 mls/hr IVPB DAILY 

[facility]


   Last Admin: 06/16/18 11:15 Dose:  100 mls/hr


Isoniazid (Niazid)  300 mg PO DAILY [facility]


   PRN Reason: Protocol


   Last Admin: 06/16/18 11:15 Dose:  300 mg


Levofloxacin (Levaquin)  750 mg PO DAILY [facility]


   PRN Reason: Protocol


   Last Admin: 06/16/18 11:14 Dose:  750 mg


Metoclopramide HCl (Reglan)  5 mg PO BID [facility]


   Last Admin: 06/16/18 17:11 Dose:  5 mg


Ondansetron HCl (Zofran Odt)  4 mg PO Q4 PRN


   PRN Reason: Nausea/Vomiting


Pantoprazole Sodium (Protonix Ec Tab)  40 mg PO DAILY [facility]


   Last Admin: 06/16/18 11:22 Dose:  40 mg


Pravastatin Sodium (Pravachol)  40 mg PO DAILY [facility]


   Last Admin: 06/16/18 11:16 Dose:  40 mg


Sucralfate (Carafate Tab)  1 gm PO TID [facility]


   Last Admin: 06/16/18 17:11 Dose:  1 gm











- Labs


Labs: 


 





 06/16/18 09:05 





 06/16/18 09:05 











- Constitutional


Appears: Non-toxic, No Acute Distress





- Head Exam


Head Exam: ATRAUMATIC





- Eye Exam


Eye Exam: EOMI.  absent: Scleral icterus





- ENT Exam


ENT Exam: Mucous Membranes Moist





- Respiratory Exam


Respiratory Exam: NORMAL BREATHING PATTERN.  absent: Accessory Muscle Use, 

Respiratory Distress





- Cardiovascular Exam


Cardiovascular Exam: +S1, +S2.  absent: Bradycardia, Tachycardia





- GI/Abdominal Exam


GI & Abdominal Exam: Soft, Tenderness (tender to deep palpation supra-umbilical)

.  absent: Distended, Firm, Guarding, Rigid





- Extremities Exam


Extremities Exam: Normal Inspection





- Neurological Exam


Neurological Exam: Alert, Awake, Oriented x3





- Psychiatric Exam


Psychiatric exam: Normal Affect





- Skin


Skin Exam: Intact, Warm





Assessment and Plan





- Assessment and Plan (Free Text)


Assessment: 





63F w/ pancreatitis


Lipase trending down; nausea and abd pain resolving


Plan: 





abx


Can remain on regular diet; will cancel MRCP this AM


if tolerating diet; no acute surgical intervention at this time


cleared for discharge home on PPI and Abx from a surgical stand point


follow up in 1 week in office


discussed w/ Dr. Ash surgical attending





[facility] PGY1





<Edi Ash - Last Filed: 06/18/18 18:12>





Objective





- Vital Signs/Intake and Output


Vital Signs (last 24 hours): 


 











Temp Pulse Resp BP Pulse Ox


 


 98 F   54 L  20   124/68   96 


 


 06/17/18 09:00  06/17/18 09:00  06/17/18 09:00  06/17/18 09:00  06/17/18 09:00











- Labs


Labs: 


 





 06/16/18 09:05 





 06/17/18 10:50 











Attending/Attestation





- Attestation


I have personally seen and examined this patient.: Yes


I have fully participated in the care of the patient.: Yes


I have reviewed all pertinent clinical information, including history, physical 

exam and plan: Yes


Notes (Text): 





Pt was seen and examined at bedside 


Agree with above note and assessment


Pt with epigastric pain and tenderness


Lipase is elevated


Labs and radiology reviewed


Ass: Possible Pancreatitis, Gastritis


Plan: C.w IV antibiotics


Repeat LFTs in am


GI consult 


No need for any surgical intervention at present


Plan d.w pt in detail

## 2018-06-17 NOTE — PN
DATE:  06/17/2018



SUBJECTIVE:  The patient is seen and examined.  Interim events noted. 

Consults noted and appreciated.  Gastroenterology and Surgery followup and

intervention noted and appreciated.  The patient remains in regular medical

floor.  Feels much better.  Abdominal pain resolved.  No chest pain.  No

shortness of breath.  No nausea, vomiting, or diarrhea.



PHYSICAL EXAMINATION:

GENERAL:  The patient is in no acute distress.

VITAL SIGNS:  Stable.

HEART:  S1, S2, normal and regular.

LUNGS:  Good bilateral air exchange.

ABDOMEN:  Soft, nontender.  No sign of acute abdomen.  No guarding, no

rigidity, no rebound.  Bowel sounds are present and normal.

EXTREMITIES:  No edema, no calf swelling, no tenderness.  No acute

ischemia.

CENTRAL NERVOUS SYSTEM:  Essentially unchanged.



DIAGNOSTIC DATA:  Available diagnostic data reviewed.  Today's lipase level

is pending.   Chemistries, otherwise, unremarkable.  Overall, the patient

is medically stable.



PLAN:  As ordered.





__________________________________________

Gamaliel Huertas MD





DD:  06/17/2018 7:48:28

DT:  06/17/2018 7:49:32

Job # [id number]

## 2018-09-05 ENCOUNTER — HOSPITAL ENCOUNTER (EMERGENCY)
Dept: HOSPITAL 31 - C.ER | Age: 63
Discharge: HOME | End: 2018-09-05
Payer: COMMERCIAL

## 2018-09-05 VITALS
HEART RATE: 58 BPM | DIASTOLIC BLOOD PRESSURE: 76 MMHG | TEMPERATURE: 98.1 F | RESPIRATION RATE: 20 BRPM | SYSTOLIC BLOOD PRESSURE: 122 MMHG

## 2018-09-05 VITALS — BODY MASS INDEX: 35 KG/M2

## 2018-09-05 VITALS — OXYGEN SATURATION: 98 %

## 2018-09-05 DIAGNOSIS — R11.2: ICD-10-CM

## 2018-09-05 DIAGNOSIS — R10.9: Primary | ICD-10-CM

## 2018-09-05 DIAGNOSIS — R19.7: ICD-10-CM

## 2018-09-05 LAB
ALBUMIN SERPL-MCNC: 4.2 G/DL (ref 3.5–5)
ALBUMIN/GLOB SERPL: 1.3 {RATIO} (ref 1–2.1)
ALT SERPL-CCNC: 35 U/L (ref 9–52)
AST SERPL-CCNC: 35 U/L (ref 14–36)
BACTERIA #/AREA URNS HPF: (no result) /[HPF]
BASOPHILS # BLD AUTO: 0 K/UL (ref 0–0.2)
BASOPHILS NFR BLD: 0.7 % (ref 0–2)
BILIRUB UR-MCNC: NEGATIVE MG/DL
BUN SERPL-MCNC: 25 MG/DL (ref 7–17)
CALCIUM SERPL-MCNC: 9.3 MG/DL (ref 8.6–10.4)
EOSINOPHIL # BLD AUTO: 0.3 K/UL (ref 0–0.7)
EOSINOPHIL NFR BLD: 5.1 % (ref 0–4)
ERYTHROCYTE [DISTWIDTH] IN BLOOD BY AUTOMATED COUNT: 15.2 % (ref 11.5–14.5)
GFR NON-AFRICAN AMERICAN: 56
GLUCOSE UR STRIP-MCNC: NORMAL MG/DL
HGB BLD-MCNC: 10.8 G/DL (ref 11–16)
LEUKOCYTE ESTERASE UR-ACNC: (no result) LEU/UL
LIPASE: 143 U/L (ref 23–300)
LYMPHOCYTES # BLD AUTO: 1.6 K/UL (ref 1–4.3)
LYMPHOCYTES NFR BLD AUTO: 26 % (ref 20–40)
MCH RBC QN AUTO: 28.8 PG (ref 27–31)
MCHC RBC AUTO-ENTMCNC: 33.6 G/DL (ref 33–37)
MCV RBC AUTO: 85.7 FL (ref 81–99)
MONOCYTES # BLD: 0.4 K/UL (ref 0–0.8)
MONOCYTES NFR BLD: 6.3 % (ref 0–10)
NEUTROPHILS # BLD: 3.9 K/UL (ref 1.8–7)
NEUTROPHILS NFR BLD AUTO: 61.9 % (ref 50–75)
NRBC BLD AUTO-RTO: 0.1 % (ref 0–2)
PH UR STRIP: 6 [PH] (ref 5–8)
PLATELET # BLD: 344 K/UL (ref 130–400)
PMV BLD AUTO: 7.4 FL (ref 7.2–11.7)
PROT UR STRIP-MCNC: NEGATIVE MG/DL
RBC # BLD AUTO: 3.76 MIL/UL (ref 3.8–5.2)
RBC # UR STRIP: NEGATIVE /UL
SP GR UR STRIP: 1.01 (ref 1–1.03)
SQUAMOUS EPITHIAL: 1 /HPF (ref 0–5)
UROBILINOGEN UR-MCNC: NORMAL MG/DL (ref 0.2–1)
WBC # BLD AUTO: 6.3 K/UL (ref 4.8–10.8)

## 2018-09-05 PROCEDURE — 80053 COMPREHEN METABOLIC PANEL: CPT

## 2018-09-05 PROCEDURE — 99284 EMERGENCY DEPT VISIT MOD MDM: CPT

## 2018-09-05 PROCEDURE — 74022 RADEX COMPL AQT ABD SERIES: CPT

## 2018-09-05 PROCEDURE — 83690 ASSAY OF LIPASE: CPT

## 2018-09-05 PROCEDURE — 81001 URINALYSIS AUTO W/SCOPE: CPT

## 2018-09-05 PROCEDURE — 85025 COMPLETE CBC W/AUTO DIFF WBC: CPT

## 2018-09-05 PROCEDURE — 96372 THER/PROPH/DIAG INJ SC/IM: CPT

## 2018-09-05 NOTE — C.PDOC
History Of Present Illness


The patient is a 63 year old female who underwent hernia repair outside of the 

[place] 6 weeks ago. Patient states she developed generalized, crampy 

abdominal pain and discomfort three days ago. Patient also reports occasional 

episodes of loose stool and nausea with vomiting. She has not taken anything 

for pain. She denies fever, chills, or urinary symptoms. 


Time Seen by Provider: 09/05/18 14:58


Chief Complaint (Nursing): Abdominal Pain


History Per: Patient


History/Exam Limitations: no limitations


Onset/Duration Of Symptoms: Days (3)


Current Symptoms Are (Timing): Still Present


Location Of Pain/Discomfort: Diffuse


Quality Of Discomfort: Cramping, "Pain"


Associated Symptoms: Other (loose stools ).  denies: Fever, Chills, Nausea, 

Vomiting


Additional History Per: Patient





Past Medical History


Reviewed: Historical Data, Nursing Documentation, Vital Signs


Vital Signs: 


 Last Vital Signs











Temp  98.2 F   09/05/18 12:38


 


Pulse  60   09/05/18 12:38


 


Resp  18   09/05/18 12:38


 


BP  111/71   09/05/18 12:38


 


Pulse Ox  98   09/05/18 15:16














- Medical History


PMH: Anemia, Arthritis, Diabetes, Gastritis, HTN, Hypercholesterolemia, 

Osteoporosis


   Denies: Chronic Kidney Disease


Surgical History: Appendectomy, Cholecystectomy





- [facility] Procedures








CLOSED ENDOSCOPIC BIOPSY OF LARGE INTESTINE (03/03/14)


ESOPHAGOGASTRODUODENOSCOPY [EGD] W/CLOSED BIOPSY (03/03/14)








Family History: States: Unknown Family Hx





- Social History


Hx Tobacco Use: No


Hx Alcohol Use: No


Hx Substance Use: No





- Immunization History


Hx Tetanus Toxoid Vaccination: No


Hx Influenza Vaccination: No


Hx Pneumococcal Vaccination: No





Review Of Systems


Constitutional: Negative for: Fever, Chills


Gastrointestinal: Positive for: Abdominal Pain.  Negative for: Nausea, Vomiting





Physical Exam





- Physical Exam


Appears: Non-toxic, No Acute Distress


Skin: Normal Color, Warm, Dry


Head: Atraumatic, Normacephalic


Eye(s): bilateral: Normal Inspection


Oral Mucosa: Moist


Neck: Supple


Chest: Symmetrical, No Deformity, No Tenderness


Cardiovascular: Rhythm Regular, No Murmur


Respiratory: Normal Breath Sounds, No Rales, No Rhonchi, No Wheezing


Gastrointestinal/Abdominal: Bowel Sounds (unremarkable ), Soft, No Tenderness, 

No Distention, No Guarding, No Rebound


Extremity: Normal ROM, Capillary Refill (less than 2 seconds )


Neurological/Psych: Oriented x3, Normal Speech, Normal Cognition


Gait: Steady





ED Course And Treatment





- Laboratory Results


Result Diagrams: 


 09/05/18 15:43





 09/05/18 15:43


Lab Interpretation: No Acute Changes


O2 Sat by Pulse Oximetry: 98 (on RA)


Pulse Ox Interpretation: Normal





- Other Rad


  ** Obstructive series


X-Ray: Viewed By Me, Read By Radiologist


Interpretation: No evidence of mechanical obstruction, nonspecific gas pattern.


Progress Note: Bloodwork, urinalysis, obstructive series abdomen ordered and 

reviewed.  Bentyl IM given.


Reevaluation Time: 17:10


Reassessment Condition: Improved





Disposition


Counseled Patient/Family Regarding: Studies Performed, Diagnosis, Need For 

Followup, Rx Given





- Disposition


Referrals: 


[facility] [Outside]


Disposition: HOME/ ROUTINE


Disposition Time: 17:15


Condition: IMPROVED


Additional Instructions: 


Follow up with surgery next week as scheduled.


Prescriptions: 


Dicyclomine [Bentyl] 20 mg PO QID PRN #20 tab


 PRN Reason: Pain, Moderate (4-7)


Instructions:  Diarrhea in Adolescents and Adults, Nausea and Vomiting, Adult (

DC)


Forms:  [employer] (Romansh)


Print Language: Tristanian





- Clinical Impression


Clinical Impression: 


 Diarrhea, Nausea, Vomiting, Abdominal pain








- Scribe Statement


The provider has reviewed the documentation as recorded by the Scribe (Carly Denson)


Provider Attestation: 








All medical record entries made by the Scribe were at my direction and 

personally dictated by me. I have reviewed the chart and agree that the record 

accurately reflects my personal performance of the history, physical exam, 

medical decision making, and the department course for this patient. I have 

also personally directed, reviewed, and agree with the discharge instructions 

and disposition.

## 2018-09-05 NOTE — RAD
Date of service: 



09/05/2018



PROCEDURE:  Radiographs of the chest and abdomen (obstructive series)



HISTORY:

Abdominal pain



COMPARISON:

No prior.



TECHNIQUE:

AP radiograph of the chest, with upright and supine radiographs of 

the abdomen.



FINDINGS:



CHEST:

Lungs: The lungs are well inflated and clear.



Cardiovascular: Normal size heart. No pulmonary vascular congestion.



Pleura: No pleural fluid. No pneumothorax.



Other findings: None.



ABDOMEN AND PELVIS:

Bowel: The bowel gas pattern is nonspecific. No evidence of 

mechanical obstruction.



Free air: None.



Bones:  There is asymmetric periarticular sclerosis at the left SI 

joint.



Other findings: Surgical clips in the right upper quadrant are 

related to prior cholecystectomy. There are surgical clips in the 

epigastrium and suture lines in the left mid abdomen. 



IMPRESSION:

Nonobstructive bowel gas pattern. 



Clear lungs. 



Asymmetric periarticular sclerosis in the left sacroiliac joints 

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  Dedicated radiographs 

and clinical follow-up is advised.

## 2024-01-02 NOTE — VASCLAB
PROCEDURE:  Lower Extremity Venous Duplex Exam.



HISTORY:

Swelling 



PRIORS:

None. 



TECHNIQUE:

Bilateral common femoral, femoral, popliteal and posterior tibial, 

peroneal and great saphenous veins were evaluated. Flow was assessed 

with color Doppler, compressibility, assessment of phasic flow and 

augmentation response.



Report prepared by   MONSTER Aldrich



FINDINGS:



RIGHT:

1. Common Femoral Vein: 



1.1. Compressibility - Fully compressible: Thrombus -  None : Flow - 

Phasic: Augmentation -Normal: Reflux - None.



2. Femoral Vein:



2.1. Compressibility - Fully compressible: Thrombus -  None : Flow - 

Phasic: Augmentation -Normal: Reflux - None.



3. Popliteal Vein: 



3.1. Compressibility - Fully compressible: Thrombus - None :  Flow - 

Phasic: Augmentation -Normal: Reflux - None.



4. Posterior Tibial Vein: 



4.1. Compressibility - Fully compressible: Thrombus -  None: Flow - 

Phasic: Augmentation -Normal: Reflux - None.



5. Peroneal Vein:



5.1. Compressibility - Fully compressible: Thrombus -  None: Flow - 

Phasic: Augmentation -Normal: Reflux - None.



6. Great Saphenous Vein:

6.1. Compressibility - Fully compressible: Thrombus - None: Flow - 

Phasic: Augmentation - Normal: Reflux - None.





LEFT:

1. Common Femoral Vein:



1.1.  Compressibility - Fully compressible: Thrombus -  None: Flow - 

Phasic: Augmentation -Normal: Reflux - None.



2. Femoral Vein:



2.1.  Compressibility - Fully compressible: Thrombus -  None: Flow - 

Phasic: Augmentation -Normal: Reflux - None.



3. Popliteal Vein:



3.1.  Compressibility - Fully compressible: Thrombus -  None : Flow - 

Phasic: Augmentation -Normal: Reflux - None.



4. Posterior Tibial Vein:



4.1.  Compressibility - Fully compressible: Thrombus -  None: Flow - 

Phasic: Augmentation -Normal: Reflux - None.



5. Peroneal Vein:



5.1.  Compressibility - Fully compressible: Thrombus -  None: Flow - 

Phasic: Augmentation -Normal: Reflux - None.



6. Great Saphenous Vein:

6.1.  Compressibility - Fully compressible: Thrombus -  None: Flow - 

Phasic: Augmentation - Normal: Reflux - None.





OTHER FINDINGS:  Right: None significant.



Left: None significant.



IMPRESSION:

Right: 



No evidence of deep or superficial vein thrombosis of the right lower 

extremity. Normal valve function noted of the right side.     



Left: 



No evidence of deep or superficial vein thrombosis of the left lower 

extremity. Normal valve function noted of the left side. You are due for mammogram.  Please call the following number to make appointment :  [phone]  It is located in [id number]    Please call [phone] to make a lab appointment for TSH for 1/22/2024